# Patient Record
Sex: FEMALE | Race: WHITE | URBAN - METROPOLITAN AREA
[De-identification: names, ages, dates, MRNs, and addresses within clinical notes are randomized per-mention and may not be internally consistent; named-entity substitution may affect disease eponyms.]

---

## 2022-10-27 NOTE — DISCHARGE INSTRUCTIONS
[facility], [address] [place] [address] Road  Telephone: [phone]    Discharge Instructions    Important reminders:    **If you have any signs and symptoms of illness (Cough, fever, congestion, nausea, vomiting, diarrhea, etc.) please call the wound care center prior to your appointment. 1. Increase Protein intake for optimal wound healing  2. No added salt to reduce any swelling  3. If diabetic, maintain good glucose control  4. If you smoke, smoking prohibits wound healing, we ask that you refrain from smoking. 5. When taking antibiotics take the entire prescription as ordered. Do not stop taking until medication is all gone unless otherwise instructed. 6. Exercise as tolerated. 7. Keep weight off wounds and reposition every 2 hours if applicable. 8. If wound(s) is on your lower extremity, elevate legs to the level of the heart or above for 30 minutes 4-5 times a day and/or when sitting. Avoid standing for long periods of time. 9. Do not get wounds wet in bath or shower unless otherwise instructed by your physician. If your wound is on your foot or leg, you may purchase a cast bag. Please ask at the pharmacy. If Vascular testing is ordered, please call [address] [place] [address] ([phone]) to schedule. Vascular tests ordered by Wound Care Physicians may take up to 2 hours to complete. Please keep that in mind when scheduling. If Vascular testing is scheduled, please bring supplies to replace your dressing after testing is done. The vascular department does not stock supplies. Wound:  Left leg    With each dressing change, rinse wounds with 0.9% Saline. (May use wound wash or soft contact solution. Both can be purchased at a local drug store). If unable to obtain saline, may use a gentle soap and water. Dressing care: NO Smoking. Do not get wet in the shower. Clean wound with Vash (give pt a bottle). Gentamicin cream and Triad mixed.  Place in wound daily. Cover with dry dressing (kerramax border or 4x4 and roll gauze). 1 tubigrip. Keep wound covered at all times    Dr Adam Fisher, Vascular Surgeon  - Stephen Huddleston for studies  [address] [place] [address], [address]  Phone# [phone]  Fax# [phone]      Home Care Agency/Facility:     Your wound-care supplies will be provided by: [employer] [facility] -   phone #: [phone]    Please note, depending on your insurance coverage, you may have out-of-pocket expenses for these supplies. Someone from the company should call you to confirm your order and discuss those potential costs before they ship your products -- please anticipate that call. If your out-of-pocket cost could be substantial, Many companies have financial hardship programs for patients who qualify, so please ask about that if you might need a hand. If you have any questions about your supplies or your potential out-of-pocket costs, or if you need to place an order for a refill of supplies (typically monthly), please call the company directly. Your  is Javon Miller    Follow up with Dr Luis Angel Whiteside In 1 week(s) in the wound care center. Wound Care Center Information: Should you experience any significant changes in your wound(s) or have questions about your wound care, please contact the [facility] at [phone] Monday  - Thursday 8:00 am - 4:00 pm and Friday 8:00 am - 1:00pm. If you need help with your wound outside these hours and cannot wait until we are again available, contact your PCP or go to the hospital emergency room. PLEASE NOTE: IF YOU ARE UNABLE TO OBTAIN WOUND SUPPLIES, CONTINUE TO USE THE SUPPLIES YOU HAVE AVAILABLE UNTIL YOU ARE ABLE TO REACH US. IT IS MOST IMPORTANT TO KEEP THE WOUND COVERED AT ALL TIMES. Patient Experience    Thank you for trusting us with your care. You may receive a survey from a company called [employer] asking for your feedback.   We would appreciate it if you took a few minutes to share your experience. Your input is very valuable to us.

## 2022-10-28 ENCOUNTER — HOSPITAL ENCOUNTER (OUTPATIENT)
Dept: WOUND CARE | Age: 72
Discharge: HOME | End: 2022-10-28
Payer: COMMERCIAL

## 2022-10-28 VITALS
HEART RATE: 73 BPM | RESPIRATION RATE: 16 BRPM | TEMPERATURE: 97.1 F | DIASTOLIC BLOOD PRESSURE: 72 MMHG | SYSTOLIC BLOOD PRESSURE: 146 MMHG

## 2022-10-28 DIAGNOSIS — I73.9 PAD (PERIPHERAL ARTERY DISEASE) (HCC): Primary | ICD-10-CM

## 2022-10-28 DIAGNOSIS — L97.223 NON-PRESSURE CHRONIC ULCER OF LEFT CALF WITH NECROSIS OF MUSCLE (HCC): ICD-10-CM

## 2022-10-28 DIAGNOSIS — E11.622 DIABETES MELLITUS WITH SKIN ULCER (HCC): ICD-10-CM

## 2022-10-28 DIAGNOSIS — I73.89 OTHER SPECIFIED PERIPHERAL VASCULAR DISEASES (HCC): ICD-10-CM

## 2022-10-28 DIAGNOSIS — L98.499 DIABETES MELLITUS WITH SKIN ULCER (HCC): ICD-10-CM

## 2022-10-28 PROCEDURE — 99203 OFFICE O/P NEW LOW 30 MIN: CPT

## 2022-10-28 PROCEDURE — 11043 DBRDMT MUSC&/FSCA 1ST 20/<: CPT

## 2022-10-28 RX ORDER — ACETAMINOPHEN AND CODEINE PHOSPHATE 300; 30 MG/1; MG/1
1 TABLET ORAL PRN
COMMUNITY
Start: 2022-09-19

## 2022-10-28 RX ORDER — SITAGLIPTIN AND METFORMIN HYDROCHLORIDE 1000; 50 MG/1; MG/1
1 TABLET, FILM COATED ORAL DAILY
COMMUNITY

## 2022-10-28 RX ORDER — ALBUTEROL SULFATE 90 UG/1
1 AEROSOL, METERED RESPIRATORY (INHALATION) EVERY 6 HOURS PRN
COMMUNITY

## 2022-10-28 RX ORDER — PRAVASTATIN SODIUM 20 MG
20 TABLET ORAL DAILY
COMMUNITY

## 2022-10-28 RX ORDER — GENTAMICIN SULFATE 1 MG/G
CREAM TOPICAL
Qty: 30 G | Refills: 1 | Status: SHIPPED | OUTPATIENT
Start: 2022-10-28

## 2022-10-28 RX ORDER — LISINOPRIL 2.5 MG/1
2.5 TABLET ORAL DAILY
COMMUNITY

## 2022-10-28 RX ORDER — DULAGLUTIDE 1.5 MG/.5ML
0.5 INJECTION, SOLUTION SUBCUTANEOUS WEEKLY
COMMUNITY

## 2022-10-28 RX ORDER — ASPIRIN 81 MG/1
81 TABLET ORAL DAILY
COMMUNITY

## 2022-10-28 NOTE — PROGRESS NOTES
Jourdan Medrano  Progress Note and Procedure Note      Andrea Pantoja  AGE: 67 y.o. GENDER: female  : 1950  TODAY'S DATE:  10/28/2022    Subjective:     Chief Complaint   Patient presents with    Wound Check     Left lower extremity         HISTORY of PRESENT ILLNESS HPI     Andrea Pantoja is a 67 y.o. female who presents today for wound evaluation. History of Wound: Patient admits to having a surgical procedure to remove skin cancer at the end of 2022. She has not been able to heal the wound since that has been sent to the wound care center. She admits to having a previous injury to the leg and having discoloration in the area where the skin cancer was for many years. She denies current nausea, vomiting, fever, chills, shortness of breath or chest pain. She admits to being diabetic and smoking 1-1/2 packs of cigarettes daily. Wound Pain:  intermittent  Severity:  3 / 10   Wound Type:  venous, arterial, diabetic, and non-healing surgical  Modifying Factors:  edema, venous stasis, diabetes, smoking, arterial insufficiency, and decreased tissue oxygenation  Associated Signs/Symptoms:  edema, drainage, and pain        PAST MEDICAL HISTORY        Diagnosis Date    Cancer [facility])     Skin cancer Left lower leg    COPD (chronic obstructive pulmonary disease) ([facility]     Diabetes mellitus ([facility]     Hyperlipidemia        PAST SURGICAL HISTORY    Past Surgical History:   Procedure Laterality Date    ORTHOPEDIC SURGERY Left        FAMILY HISTORY    History reviewed. No pertinent family history.     SOCIAL HISTORY    Social History     Tobacco Use    Smoking status: Every Day     Packs/day: 1.50     Years: 40.00     Pack years: 60.00     Types: Cigarettes    Smokeless tobacco: Never   Vaping Use    Vaping Use: Never used   Substance Use Topics    Alcohol use: Not Currently    Drug use: Never       ALLERGIES    No Known Allergies    MEDICATIONS    Current Outpatient Medications on File Prior to Encounter   Medication Sig Dispense Refill    Multiple Vitamin (MULTIVITAMIN PO) Take 1 tablet by mouth daily      albuterol sulfate HFA (PROVENTIL HFA) 108 (90 Base) MCG/ACT inhaler Inhale 1 puff into the lungs every 6 hours as needed      acetaminophen-codeine (TYLENOL #3) 300-30 MG per tablet Take 1 tablet by mouth as needed. aspirin 81 MG EC tablet Take 81 mg by mouth daily      dulaglutide (TRULICITY) 1.5 HF/9.0YP SC injection Inject 0.5 mLs into the skin once a week      lisinopril (PRINIVIL;ZESTRIL) 2.5 MG tablet Take 2.5 mg by mouth daily      pravastatin (PRAVACHOL) 20 MG tablet Take 20 mg by mouth daily      sitaGLIPtan-metFORMIN (JANUMET)  MG per tablet Take 1 tablet by mouth daily       No current facility-administered medications on file prior to encounter. REVIEW OF SYSTEMS    Pertinent items are noted in HPI. Objective:      BP (!) 146/72   Pulse 73   Temp 97.1 °F (36.2 °C) (Infrared)   Resp 16     PHYSICAL EXAM    Vascular: Vascular status Impaired  palpable pedal pulses, right DP1/4 and PT1/4, left DP1/4 and PT1/4. CFT 3 seconds digits 1 to 5 bilateral.  Hair growthAbsent  both lower extremities and feet. Skin temperature is warm to cool from pretibial area to distal digits bilateral.  Exam is negative for rubor, pallor, cyanosis or signs of acute vascular compromise bilaterally. Exam is negative for edema bilateral lower extremity. Varicosities Absent  bilateral lower extremity. Neuro: Neurologic status diminished bilateral with epicritic Present , proprioceptive Present, vibratory sensationPresent and protopathicPresent. DTRs Present bilateral Achilles. There were no reproducible neuritic symptoms on exam bilateral feet/ankles. Derm: Ulceration to left leg. Ecchymosis Absent  bilateral feet/foot. Musculoskeletal: No pain with debridement of wound 5/5 muscle strength in/eversion and dorsi/plantarflexion bilateral feet.   No gross instability noted.          Assessment:     Problem List Items Addressed This Visit    None  Visit Diagnoses       PAD (peripheral artery disease) ([facility]    -  Primary    Relevant Orders    VL DUP LOWER EXTREMITY ARTERIES LEFT            Procedure Note    Performed by: Sridevi Hampton DPM    Consent obtained: Yes    Time out taken:  Yes    Pain Control: Anesthetic  Anesthetic: 4% Lidocaine Cream     Debridement:Excisional Debridement    Using curette the wound was sharply debrided    down through and including the removal of epidermis, dermis, subcutaneous tissue, and muscle/fascia. Devitalized Tissue Debrided:  fibrin, biofilm, slough, necrotic/eschar, and exudate    Pre Debridement Measurements:  Are located in the Wound Documentation Flow Sheet    Wound #: 1     Post  Debridement Measurements:  Wound 10/28/22 Leg Left; Lower;Medial #1 (Active)   Wound Image   10/28/22 0948   Wound Etiology Non-Healing Surgical 10/28/22 0948   Wound Cleansed Cleansed with saline 10/28/22 0948   Wound Length (cm) 3.9 cm 10/28/22 0948   Wound Width (cm) 1.5 cm 10/28/22 0948   Wound Depth (cm) 0.3 cm 10/28/22 0948   Wound Surface Area (cm^2) 5.85 cm^2 10/28/22 0948   Wound Volume (cm^3) 1.755 cm^3 10/28/22 0948   Post-Procedure Length (cm) 3.9 cm 10/28/22 1018   Post-Procedure Width (cm) 1.5 cm 10/28/22 1018   Post-Procedure Depth (cm) 0.3 cm 10/28/22 1018   Post-Procedure Surface Area (cm^2) 5.85 cm^2 10/28/22 1018   Post-Procedure Volume (cm^3) 1.755 cm^3 10/28/22 1018   Wound Assessment Bleeding 10/28/22 1018   Drainage Amount Small 10/28/22 0948   Drainage Description Yellow 10/28/22 0948   Odor None 10/28/22 0948   Aga-wound Assessment Maceration 10/28/22 0948   Margins Defined edges 10/28/22 0948   Wound Thickness Description not for Pressure Injury Full thickness 10/28/22 0948   Number of days: 0           Total Surface Area Debrided:  5.85 sq cm     Percentage of wound debrided 100%    Bleeding:  Minimal    Hemostasis Achieved: by pressure    Procedural Pain:  0  / 10     Post Procedural Pain:  0 / 10     Response to treatment:  Well tolerated by patient. Plan:   Patient examined and evaluated  Wound sharply debrided without incident  Discussed appropriate diabetic diet  Encourage smoking decrease or cessation  Request the Pt smoke outside  Daily dressing changes with gentamicin cream and triad  Vascular studies ordered consult vascular surgery  The nature of the patient's condition was explained in depth.  The patient was informed that their compliance to the treatment plan is paramount to successful healing and prevention of further ulceration and/or infection     Discharge Treatment Daily dressing changes    Written Patient Discharge Instructions Given            Electronically signed by Tremaine Anderson DPM on 10/28/2022 at 10:24 AM

## 2022-10-28 NOTE — PLAN OF CARE
Discharge instructions given. Patient verbalized understanding. Return to [facility] in 1 week(s).   Art studies ordered, Ref to Dr Aroldo Ryder

## 2022-10-28 NOTE — PLAN OF CARE
[address]:      [address] f: [phone] [phone] p: [phone] [email or web address]     Ordering Center: Loyd Terry [address] [place] [address]  [phone]  Dept: [phone]   Fax# [phone]    Patient Information:      esvinjolene [id number]  [address], [address] [facility]  [phone]     1950    67 y.o. Todays date 10/28/2022      Insurance:      PRIMARY INSURANCE:  Plan: BCBS - [place] HMO  Coverage: BCBS  Effective Date: 2022  Group Number: [unfilled]  Subscriber Number: [id number] - ([facility])    Payer/Plan Subscr  Sex Relation Sub. Ins. ID Effective Group Num   1. [address] [facility] 1950 Female Self [id number] [facility]                                    [id number]         Patient Wound Information:     Additional ICD-10 Codes: E11.621, L97.223    There is no problem list on file for this patient. WOUNDS REQUIRING DRESSING SUPPLIES:     Wound 10/28/22 Leg Left; Lower;Medial #1 (Active)   Wound Image   10/28/22 09   Wound Etiology Non-Healing Surgical 10/28/22 09   Wound Cleansed Cleansed with saline 10/28/22 0948   Wound Length (cm) 3.9 cm 10/28/22 0948   Wound Width (cm) 1.5 cm 10/28/22 0948   Wound Depth (cm) 0.3 cm 10/28/22 0948   Wound Surface Area (cm^2) 5.85 cm^2 10/28/22 0948   Wound Volume (cm^3) 1.755 cm^3 10/28/22 0948   Post-Procedure Length (cm) 3.9 cm 10/28/22 1018   Post-Procedure Width (cm) 1.5 cm 10/28/22 1018   Post-Procedure Depth (cm) 0.3 cm 10/28/22 1018   Post-Procedure Surface Area (cm^2) 5.85 cm^2 10/28/22 1018   Post-Procedure Volume (cm^3) 1.755 cm^3 10/28/22 1018   Wound Assessment Bleeding 10/28/22 1018   Drainage Amount Moderate 10/28/22 1018   Drainage Description Yellow 10/28/22 0948   Odor None 10/28/22 0948   Aga-wound Assessment Maceration 10/28/22 0948   Margins Defined edges 10/28/22 09   Wound Thickness Description not for Pressure Injury Full thickness 10/28/22 0948   Number of days: 0          Supplies Requested :      DISPENSE AS WRITTEN    WOUND #: 1   PRIMARY DRESSING:    None   Cover and Secure with:  Other Kerramax Gentle border     FREQUENCY OF DRESSING CHANGES:  Daily    Wound Thickness [x] Full   []Partial             Patient Wound(s) Debrided: [x] Yes   [] No    Debridement Date: 10/28/2022    Debribement Type: Excisional/Sharp    ADDITIONAL ITEMS:  [] Gloves Small  [x] Gloves Medium [] Gloves Large [] Gloves XLarge [] Paper Tape 2\" [] Paper Tape 3\"  [] Medipore Tape 3\" [] Medipore Tape 4\"    [] Hypofix skin sensitive tape 2\"  [] Hypofix skin sensitive tape 4\"  [x] Saline  [] Skin Prep   [] Adhesive Remover   [x] Cotton Tip Applicators  [] Tubular Stocking   [] Size E  [] Size G  [] Other:    Patient currently being seen by Home Health: [] Yes   [x] No    Quantity of days dispensed:  []15  [x]30  []60  []90 Days    Order valid for 90 days    Provider Information:      PROVIDER'S NAME/NPI  Eliana Correa  [id number]    I give permission to coordinate the care for this patient

## 2022-10-31 NOTE — DISCHARGE INSTRUCTIONS
[address], [address]  Telephone: [phone]     Discharge Instructions     Important reminders:     **If you have any signs and symptoms of illness (Cough, fever, congestion, nausea, vomiting, diarrhea, etc.) please call the wound care center prior to your appointment. 1. Increase Protein intake for optimal wound healing  2. No added salt to reduce any swelling  3. If diabetic, maintain good glucose control  4. If you smoke, smoking prohibits wound healing, we ask that you refrain from smoking. 5. When taking antibiotics take the entire prescription as ordered. Do not stop taking until medication is all gone unless otherwise instructed. 6. Exercise as tolerated. 7. Keep weight off wounds and reposition every 2 hours if applicable. 8. If wound(s) is on your lower extremity, elevate legs to the level of the heart or above for 30 minutes 4-5 times a day and/or when sitting. Avoid standing for long periods of time. 9. Do not get wounds wet in bath or shower unless otherwise instructed by your physician. If your wound is on your foot or leg, you may purchase a cast bag. Please ask at the pharmacy. If Vascular testing is ordered, please call [address] [place] [address] [phone]) to schedule. Vascular tests ordered by Wound Care Physicians may take up to 2 hours to complete. Please keep that in mind when scheduling. If Vascular testing is scheduled, please bring supplies to replace your dressing after testing is done. The vascular department does not stock supplies. Wound:  Left leg     With each dressing change, rinse wounds with 0.9% Saline. (May use wound wash or soft contact solution. Both can be purchased at a local drug store). If unable to obtain saline, may use a gentle soap and water. Dressing care: NO Smoking. Do not get wet in the shower. Clean wound with Vash (give pt a bottle). Gentamicin cream and Triad mixed. Place in wound daily. Cover with dry dressing (kerramax border or 4x4 and roll gauze). 1 tubigrip. Keep wound covered at all times     Dr Mariama cMgraw, Vascular Surgeon  - Kristyn Serrato for studies  [address] [facility], [address]  Phone# [phone]  Fax# [phone]        Home Care Agency/Facility:      Your wound-care supplies will be provided by: [facility] -   phone #: [phone]     Please note, depending on your insurance coverage, you may have out-of-pocket expenses for these supplies. Someone from the company should call you to confirm your order and discuss those potential costs before they ship your products -- please anticipate that call. If your out-of-pocket cost could be substantial, Many companies have financial hardship programs for patients who qualify, so please ask about that if you might need a hand. If you have any questions about your supplies or your potential out-of-pocket costs, or if you need to place an order for a refill of supplies (typically monthly), please call the company directly. Your  is Matthew Baldwin     Follow up with Dr Rudy Marley In 1 week(s) in the wound care center. Wound Care Center Information: Should you experience any significant changes in your wound(s) or have questions about your wound care, please [facility] at [phone] Monday  - Thursday 8:00 am - 4:00 pm and Friday 8:00 am - 1:00pm. If you need help with your wound outside these hours and cannot wait until we are again available, contact your PCP or go to the hospital emergency room. PLEASE NOTE: IF YOU ARE UNABLE TO OBTAIN WOUND SUPPLIES, CONTINUE TO USE THE SUPPLIES YOU HAVE AVAILABLE UNTIL YOU ARE ABLE TO REACH US. IT IS MOST IMPORTANT TO KEEP THE WOUND COVERED AT ALL TIMES. Patient Experience     Thank you for trusting us with your care. You may receive a survey from a company called [employer] asking for your feedback.   We would appreciate it if you took a few minutes to share your experience. Your input is very valuable to us.

## 2022-11-04 ENCOUNTER — HOSPITAL ENCOUNTER (OUTPATIENT)
Dept: WOUND CARE | Age: 72
Discharge: HOME | End: 2022-11-04
Payer: COMMERCIAL

## 2022-11-04 VITALS
RESPIRATION RATE: 16 BRPM | HEART RATE: 57 BPM | SYSTOLIC BLOOD PRESSURE: 163 MMHG | TEMPERATURE: 97.1 F | DIASTOLIC BLOOD PRESSURE: 62 MMHG

## 2022-11-04 DIAGNOSIS — L97.223 NON-PRESSURE CHRONIC ULCER OF LEFT CALF WITH NECROSIS OF MUSCLE (HCC): Primary | ICD-10-CM

## 2022-11-04 PROCEDURE — 11043 DBRDMT MUSC&/FSCA 1ST 20/<: CPT

## 2022-11-04 RX ORDER — BACITRACIN, NEOMYCIN, POLYMYXIN B 400; 3.5; 5 [USP'U]/G; MG/G; [USP'U]/G
OINTMENT TOPICAL ONCE
Status: CANCELLED | OUTPATIENT
Start: 2022-11-04 | End: 2022-11-04

## 2022-11-04 RX ORDER — LIDOCAINE HYDROCHLORIDE 40 MG/ML
SOLUTION TOPICAL ONCE
Status: CANCELLED | OUTPATIENT
Start: 2022-11-04 | End: 2022-11-04

## 2022-11-04 RX ORDER — LIDOCAINE HYDROCHLORIDE 20 MG/ML
JELLY TOPICAL ONCE
Status: CANCELLED | OUTPATIENT
Start: 2022-11-04 | End: 2022-11-04

## 2022-11-04 RX ORDER — BACITRACIN ZINC AND POLYMYXIN B SULFATE 500; 1000 [USP'U]/G; [USP'U]/G
OINTMENT TOPICAL ONCE
Status: CANCELLED | OUTPATIENT
Start: 2022-11-04 | End: 2022-11-04

## 2022-11-04 RX ORDER — CLOBETASOL PROPIONATE 0.5 MG/G
OINTMENT TOPICAL ONCE
Status: CANCELLED | OUTPATIENT
Start: 2022-11-04 | End: 2022-11-04

## 2022-11-04 RX ORDER — GINSENG 100 MG
CAPSULE ORAL ONCE
Status: CANCELLED | OUTPATIENT
Start: 2022-11-04 | End: 2022-11-04

## 2022-11-04 RX ORDER — LIDOCAINE 40 MG/G
CREAM TOPICAL ONCE
Status: CANCELLED | OUTPATIENT
Start: 2022-11-04 | End: 2022-11-04

## 2022-11-04 RX ORDER — GENTAMICIN SULFATE 1 MG/G
OINTMENT TOPICAL ONCE
Status: DISCONTINUED | OUTPATIENT
Start: 2022-11-04 | End: 2022-11-05 | Stop reason: HOSPADM

## 2022-11-04 RX ORDER — LIDOCAINE 50 MG/G
OINTMENT TOPICAL ONCE
Status: CANCELLED | OUTPATIENT
Start: 2022-11-04 | End: 2022-11-04

## 2022-11-04 RX ORDER — CYANOCOBALAMIN (VITAMIN B-12) 500 MCG
5000 TABLET ORAL DAILY
COMMUNITY

## 2022-11-04 RX ORDER — BETAMETHASONE DIPROPIONATE 0.05 %
OINTMENT (GRAM) TOPICAL ONCE
Status: CANCELLED | OUTPATIENT
Start: 2022-11-04 | End: 2022-11-04

## 2022-11-04 RX ORDER — ASCORBIC ACID 500 MG
500 TABLET ORAL DAILY
COMMUNITY

## 2022-11-04 RX ORDER — GENTAMICIN SULFATE 1 MG/G
OINTMENT TOPICAL ONCE
Status: CANCELLED | OUTPATIENT
Start: 2022-11-04 | End: 2022-11-04

## 2022-11-04 RX ORDER — LIDOCAINE 40 MG/G
CREAM TOPICAL ONCE
Status: DISCONTINUED | OUTPATIENT
Start: 2022-11-04 | End: 2022-11-05 | Stop reason: HOSPADM

## 2022-11-04 NOTE — PLAN OF CARE
Discharge instructions given. Patient verbalized understanding. Return to [address] [place] [address],[address] in 1 week(s).

## 2022-11-04 NOTE — PROGRESS NOTES
Jourdan Medrano  Progress Note and Procedure Note      Ang Kearns  AGE: 67 y.o. GENDER: female  : 1950  TODAY'S DATE:  2022    Subjective:     Chief Complaint   Patient presents with    Wound Check         HISTORY of PRESENT ILLNESS HPI     Ang Kearns is a 67 y.o. female who presents today for wound evaluation. History of Wound: Patient admits to having a surgical procedure to remove skin cancer at the end of 2022. She has not been able to heal the wound since that has been sent to the wound care center. She admits to having a previous injury to the leg and having discoloration in the area where the skin cancer was for many years. She denies current nausea, vomiting, fever, chills, shortness of breath or chest pain. She admits to being diabetic and smoking 1-1/2 packs of cigarettes daily. Wound Pain:  intermittent  Severity:  3 / 10   Wound Type:  venous, arterial, diabetic, and non-healing surgical  Modifying Factors:  edema, venous stasis, diabetes, smoking, arterial insufficiency, and decreased tissue oxygenation  Associated Signs/Symptoms:  edema, drainage, and pain        PAST MEDICAL HISTORY        Diagnosis Date    Cancer [facility])     Skin cancer Left lower leg    COPD (chronic obstructive pulmonary disease) ([facility] [address]     Diabetes mellitus ([facility] [address]     Hyperlipidemia        PAST SURGICAL HISTORY    Past Surgical History:   Procedure Laterality Date    ORTHOPEDIC SURGERY Left        FAMILY HISTORY    No family history on file.     SOCIAL HISTORY    Social History     Tobacco Use    Smoking status: Every Day     Packs/day: 1.50     Years: 40.00     Pack years: 60.00     Types: Cigarettes    Smokeless tobacco: Never   Vaping Use    Vaping Use: Never used   Substance Use Topics    Alcohol use: Not Currently    Drug use: Never       ALLERGIES    No Known Allergies    MEDICATIONS    Current Outpatient Medications on File Prior to Encounter   Medication Sig Dispense Refill Cyanocobalamin (VITAMIN B 12) 500 MCG TABS Take 5,000 mcg by mouth daily      vitamin C (ASCORBIC ACID) 500 MG tablet Take 500 mg by mouth daily      Calcium Carb-Cholecalciferol (CALCIUM 1000 + D) 1000-800 MG-UNIT TABS Take 2 tablets by mouth daily      Multiple Vitamin (MULTIVITAMIN PO) Take 1 tablet by mouth daily      albuterol sulfate HFA (PROVENTIL;VENTOLIN;PROAIR) 108 (90 Base) MCG/ACT inhaler Inhale 1 puff into the lungs every 6 hours as needed (Patient not taking: Reported on 11/4/2022)      acetaminophen-codeine (TYLENOL #3) 300-30 MG per tablet Take 1 tablet by mouth as needed. aspirin 81 MG EC tablet Take 81 mg by mouth daily      dulaglutide (TRULICITY) 1.5 LF/0.5PA SC injection Inject 0.5 mLs into the skin once a week      lisinopril (PRINIVIL;ZESTRIL) 2.5 MG tablet Take 2.5 mg by mouth daily      pravastatin (PRAVACHOL) 20 MG tablet Take 20 mg by mouth daily      sitaGLIPtan-metFORMIN (JANUMET)  MG per tablet Take 1 tablet by mouth daily      gentamicin (GARAMYCIN) 0.1 % cream Apply topically daily. 30 g 1     No current facility-administered medications on file prior to encounter. REVIEW OF SYSTEMS    Pertinent items are noted in HPI. Objective:      BP (!) 163/62   Pulse 57   Temp 97.1 °F (36.2 °C)   Resp 16     PHYSICAL EXAM    Vascular: Vascular status Impaired  palpable pedal pulses, right DP1/4 and PT1/4, left DP1/4 and PT1/4. CFT 3 seconds digits 1 to 5 bilateral.  Hair growthAbsent  both lower extremities and feet. Skin temperature is warm to cool from pretibial area to distal digits bilateral.  Exam is negative for rubor, pallor, cyanosis or signs of acute vascular compromise bilaterally. Exam is negative for edema bilateral lower extremity. Varicosities Absent  bilateral lower extremity. Neuro: Neurologic status diminished bilateral with epicritic Present , proprioceptive Present, vibratory sensationPresent and protopathicPresent.   DTRs Present bilateral Achilles. There were no reproducible neuritic symptoms on exam bilateral feet/ankles. Derm: Ulceration to left leg. Ecchymosis Absent  bilateral feet/foot. Musculoskeletal: No pain with debridement of wound 5/5 muscle strength in/eversion and dorsi/plantarflexion bilateral feet. No gross instability noted. 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 ([address] - Primary    Relevant Medications    lidocaine (LMX) 4 % cream (Start on 11/4/2022 11:00 AM)    gentamicin (GARAMYCIN) 0.1 % ointment (Start on 11/4/2022 11:00 AM)    Other Relevant Orders    Initiate Outpatient Wound Care Protocol       Procedure Note    Performed by: Sabine Hendrickson DPM    Consent obtained: Yes    Time out taken:  Yes    Pain Control: Anesthetic  Anesthetic: 4% Lidocaine Cream     Debridement:Excisional Debridement    Using curette the wound was sharply debrided    down through and including the removal of epidermis, dermis, subcutaneous tissue, and muscle/fascia. Devitalized Tissue Debrided:  fibrin, biofilm, slough, necrotic/eschar, and exudate    Pre Debridement Measurements:  Are located in the Wound Documentation Flow Sheet    Wound #: 1     Wound Care Documentation:  Wound 10/28/22 Leg Left; Lower;Medial #1 (Active)   Wound Image   10/28/22 0948   Wound Etiology Non-Healing Surgical 11/04/22 1010   Wound Cleansed Cleansed with saline 11/04/22 1010   Dressing/Treatment Dry dressing 10/28/22 1018   Wound Length (cm) 4 cm 11/04/22 1010   Wound Width (cm) 1.9 cm 11/04/22 1010   Wound Depth (cm) 0.3 cm 11/04/22 1010   Wound Surface Area (cm^2) 7.6 cm^2 11/04/22 1010   Change in Wound Size % (l*w) -29.91 11/04/22 1010   Wound Volume (cm^3) 2.28 cm^3 11/04/22 1010   Wound Healing % -30 11/04/22 1010   Post-Procedure Length (cm) 4 cm 11/04/22 1030   Post-Procedure Width (cm) 1.9 cm 11/04/22 1030   Post-Procedure Depth (cm) 0.3 cm 11/04/22 1030 Post-Procedure Surface Area (cm^2) 7.6 cm^2 11/04/22 1030   Post-Procedure Volume (cm^3) 2.28 cm^3 11/04/22 1030   Wound Assessment Bleeding 11/04/22 1030   Drainage Amount Moderate 11/04/22 1030   Drainage Description Yellow;Serosanguinous; Thick 11/04/22 1010   Odor None 11/04/22 1010   Aga-wound Assessment Hemosiderin staining (brown yellow) 11/04/22 1010   Margins Defined edges 11/04/22 1010   Wound Thickness Description not for Pressure Injury Full thickness 10/28/22 0948   Number of days: 7         Total Surface Area Debrided:  7.6 sq cm     Percentage of wound debrided 100%    Bleeding:  Minimal    Hemostasis Achieved:  by pressure    Procedural Pain:  0  / 10     Post Procedural Pain:  0 / 10     Response to treatment:  Well tolerated by patient. Plan:   Patient examined and evaluated  Wound sharply debrided without incident  Discussed appropriate diabetic diet  Encourage smoking decrease or cessation  Request the Pt smoke outside  Daily dressing changes with gentamicin cream and triad  Admits to having her vascular studies next week and to follow-up with vascular surgery to the following week  The nature of the patient's condition was explained in depth.  The patient was informed that their compliance to the treatment plan is paramount to successful healing and prevention of further ulceration and/or infection     Discharge Treatment Daily dressing changes    Written Patient Discharge Instructions Given            Electronically signed by Sridevi Hampton DPM on 11/4/2022 at 10:43 AM

## 2022-11-07 ENCOUNTER — PROCEDURE VISIT (OUTPATIENT)
Dept: VASCULAR SURGERY | Age: 72
End: 2022-11-07

## 2022-11-07 DIAGNOSIS — I73.9 PAD (PERIPHERAL ARTERY DISEASE) (HCC): ICD-10-CM

## 2022-11-11 ENCOUNTER — HOSPITAL ENCOUNTER (OUTPATIENT)
Dept: WOUND CARE | Age: 72
Discharge: HOME | End: 2022-11-11
Payer: COMMERCIAL

## 2022-11-11 VITALS
HEART RATE: 63 BPM | RESPIRATION RATE: 16 BRPM | DIASTOLIC BLOOD PRESSURE: 65 MMHG | TEMPERATURE: 97 F | SYSTOLIC BLOOD PRESSURE: 165 MMHG

## 2022-11-11 DIAGNOSIS — L97.223 NON-PRESSURE CHRONIC ULCER OF LEFT CALF WITH NECROSIS OF MUSCLE (HCC): Primary | ICD-10-CM

## 2022-11-11 PROCEDURE — 11043 DBRDMT MUSC&/FSCA 1ST 20/<: CPT

## 2022-11-11 RX ORDER — GINSENG 100 MG
CAPSULE ORAL ONCE
Status: CANCELLED | OUTPATIENT
Start: 2022-11-11 | End: 2022-11-11

## 2022-11-11 RX ORDER — LIDOCAINE HYDROCHLORIDE 20 MG/ML
JELLY TOPICAL ONCE
Status: CANCELLED | OUTPATIENT
Start: 2022-11-11 | End: 2022-11-11

## 2022-11-11 RX ORDER — LIDOCAINE 50 MG/G
OINTMENT TOPICAL ONCE
Status: CANCELLED | OUTPATIENT
Start: 2022-11-11 | End: 2022-11-11

## 2022-11-11 RX ORDER — CLOBETASOL PROPIONATE 0.5 MG/G
OINTMENT TOPICAL ONCE
Status: CANCELLED | OUTPATIENT
Start: 2022-11-11 | End: 2022-11-11

## 2022-11-11 RX ORDER — BETAMETHASONE DIPROPIONATE 0.05 %
OINTMENT (GRAM) TOPICAL ONCE
Status: CANCELLED | OUTPATIENT
Start: 2022-11-11 | End: 2022-11-11

## 2022-11-11 RX ORDER — LIDOCAINE 40 MG/G
CREAM TOPICAL ONCE
Status: CANCELLED | OUTPATIENT
Start: 2022-11-11 | End: 2022-11-11

## 2022-11-11 RX ORDER — LIDOCAINE HYDROCHLORIDE 40 MG/ML
SOLUTION TOPICAL ONCE
Status: CANCELLED | OUTPATIENT
Start: 2022-11-11 | End: 2022-11-11

## 2022-11-11 RX ORDER — LIDOCAINE 40 MG/G
CREAM TOPICAL ONCE
Status: DISCONTINUED | OUTPATIENT
Start: 2022-11-11 | End: 2022-11-12 | Stop reason: HOSPADM

## 2022-11-11 RX ORDER — BACITRACIN ZINC AND POLYMYXIN B SULFATE 500; 1000 [USP'U]/G; [USP'U]/G
OINTMENT TOPICAL ONCE
Status: CANCELLED | OUTPATIENT
Start: 2022-11-11 | End: 2022-11-11

## 2022-11-11 RX ORDER — GENTAMICIN SULFATE 1 MG/G
OINTMENT TOPICAL ONCE
Status: CANCELLED | OUTPATIENT
Start: 2022-11-11 | End: 2022-11-11

## 2022-11-11 RX ORDER — BACITRACIN, NEOMYCIN, POLYMYXIN B 400; 3.5; 5 [USP'U]/G; MG/G; [USP'U]/G
OINTMENT TOPICAL ONCE
Status: CANCELLED | OUTPATIENT
Start: 2022-11-11 | End: 2022-11-11

## 2022-11-11 NOTE — PLAN OF CARE
Discharge instructions given. Patient verbalized understanding. Return to [address] [place] [address],[address] in 1 week(s).   Called/faxed orders to [facility]- prescription for [facility]

## 2022-11-14 ENCOUNTER — OFFICE VISIT (OUTPATIENT)
Dept: VASCULAR SURGERY | Age: 72
End: 2022-11-14
Payer: COMMERCIAL

## 2022-11-14 VITALS
DIASTOLIC BLOOD PRESSURE: 78 MMHG | WEIGHT: 179 LBS | BODY MASS INDEX: 28.09 KG/M2 | SYSTOLIC BLOOD PRESSURE: 136 MMHG | HEIGHT: 67 IN

## 2022-11-14 DIAGNOSIS — I73.9 PAD (PERIPHERAL ARTERY DISEASE) (HCC): Primary | ICD-10-CM

## 2022-11-14 PROCEDURE — 99203 OFFICE O/P NEW LOW 30 MIN: CPT | Performed by: SURGERY

## 2022-11-14 PROCEDURE — 1123F ACP DISCUSS/DSCN MKR DOCD: CPT | Performed by: SURGERY

## 2022-11-14 NOTE — LETTER
[facility]) - Vascular and Endovascular Surgeons  [address] [address]  Phone: [phone]  Fax: [phone]    Dominic Rosa MD    November 14, 2022     Jose Gutierrez MD  [address]    Patient: Ang Kearns   MR Number: [id number]   YOB: 1950   Date of Visit: 11/14/2022       Dear Jose Gutierrez:    Thank you for referring Ang Kearns to me for evaluation/treatment. Below are the relevant portions of my assessment and plan of care. If you have questions, please do not hesitate to call me. I look forward to following Jabari Neth along with you.     Sincerely,      Dominic Rosa MD

## 2022-11-14 NOTE — PROGRESS NOTES
[facility]  Consultation Note    Chief Complaint / Reason for Consultation  LLE ulcer    History of Present Illness  Patient is a 67 y.o. female with COPD, diabetes and hyperlipidemia referred today by Dr. Conor Sandhu for a left leg ulceration. This developed following a local skin cancer removal in August.  Patient complains of pain at the site. Unable to relate any history of claudication. No history of deep vein thrombosis. Denies aching heaviness in her legs. Review of Systems     Denies fevers, chills, chest pain, shortness of breath, nausea, vomiting, hematemesis, diarrhea, constipation, melena, hematochezia, wt changes, vision problems, blindness, hearing problems, facial droop, slurred speech, extremity weakness, extremity numbness, dysuria. Past Medical History:   has a past medical history of Cancer ([facility] [address] COPD (chronic obstructive pulmonary disease) ([facility] [address] Diabetes mellitus ([facility] [address] and Hyperlipidemia. Past Surgical History:   has a past surgical history that includes orthopedic surgery (Left). Medications:  Current Outpatient Medications on File Prior to Visit   Medication Sig Dispense Refill    collagenase (SANTYL) 250 UNIT/GM ointment Quantity sufficient for 30 days. Apply topically daily. Wounds (cm): Left lower leg- 3.7 x 2 60 g 1    Cyanocobalamin (VITAMIN B 12) 500 MCG TABS Take 5,000 mcg by mouth daily      vitamin C (ASCORBIC ACID) 500 MG tablet Take 500 mg by mouth daily      Calcium Carb-Cholecalciferol (CALCIUM 1000 + D) 1000-800 MG-UNIT TABS Take 2 tablets by mouth daily      Multiple Vitamin (MULTIVITAMIN PO) Take 1 tablet by mouth daily      albuterol sulfate HFA (PROVENTIL;VENTOLIN;PROAIR) 108 (90 Base) MCG/ACT inhaler Inhale 1 puff into the lungs every 6 hours as needed (Patient not taking: Reported on 11/4/2022)      acetaminophen-codeine (TYLENOL #3) 300-30 MG per tablet Take 1 tablet by mouth as needed.       aspirin 81 MG EC tablet Take 81 mg by mouth daily      dulaglutide (TRULICITY) 1.5 XQ/0.8OE SC injection Inject 0.5 mLs into the skin once a week      lisinopril (PRINIVIL;ZESTRIL) 2.5 MG tablet Take 2.5 mg by mouth daily      pravastatin (PRAVACHOL) 20 MG tablet Take 20 mg by mouth daily      sitaGLIPtan-metFORMIN (JANUMET)  MG per tablet Take 1 tablet by mouth daily      gentamicin (GARAMYCIN) 0.1 % cream Apply topically daily. 30 g 1     No current facility-administered medications on file prior to visit. Allergies:  No Known Allergies     Social History:   reports that she has been smoking cigarettes. She has a 60.00 pack-year smoking history. She has never used smokeless tobacco. She reports that she does not currently use alcohol. She reports that she does not use drugs. Family History:  family history is not on file. Vital Signs  Vitals:    11/14/22 0949   BP: 136/78   Weight: 179 lb (81.2 kg)   Height: 5' 7\" (1.702 m)       Physical Examination  General:  no apparent distress  Psychiatric: affect appropriate  Head/Eyes/Ears/Nose/Throat:  Atraumatic, vision and hearing intact, face symmetric  Neck:  supple  Chest/Lungs: clear to auscultation bilaterally  Cardiac:  Regular rate and rhythm  Abdomen: soft, nontender  Extremities: 4 x 2 cm ulcer left lower leg medial just proximal to the ankle. Associated skin changes consistent with venous stasis dermatitis. Trace edema noted. 1+ palpable  Labs  No results found for: WBC, HGB, HCT, MCV, PLT  No results found for: NA, K, CL, CO2, PHOS, BUN, CREATININE, CA   No results found for: GLU    Imaging: The right MILLA is . 87. The left MILLA is . 89. The majority of the waveforms are biphasic throughout the left lower    extremity. Elevated velocity of the common femoral artery suggest a greater than 50%    stenosis. Elevated velocity of the distal superficial femoral artery suggests a less    than 50% stenosis.        No orders to display     No results found.    Assessment:   Left lower extremity ulceration      Plan:  19-year-old female with left lower extremity ulceration that is likely mixed venous and arterial.  Her noninvasive studies suggest mild arterial insufficiency and this was reviewed with her in detail today. Would continue with current wound care and increase the amount of compression to the area. If she continues to struggle or sees slow wound healing then would move forward with peripheral angiography and this was discussed with her today. Margarito Beach M.D., FACS.  11/14/2022  10:01 AM

## 2022-11-18 ENCOUNTER — HOSPITAL ENCOUNTER (OUTPATIENT)
Dept: WOUND CARE | Age: 72
Discharge: HOME | End: 2022-11-18
Payer: COMMERCIAL

## 2022-11-18 VITALS
RESPIRATION RATE: 18 BRPM | SYSTOLIC BLOOD PRESSURE: 166 MMHG | TEMPERATURE: 97.3 F | DIASTOLIC BLOOD PRESSURE: 77 MMHG | HEART RATE: 71 BPM

## 2022-11-18 DIAGNOSIS — L97.223 NON-PRESSURE CHRONIC ULCER OF LEFT CALF WITH NECROSIS OF MUSCLE (HCC): Primary | ICD-10-CM

## 2022-11-18 PROCEDURE — 11043 DBRDMT MUSC&/FSCA 1ST 20/<: CPT

## 2022-11-18 PROCEDURE — 29581 APPL MULTLAYER CMPRN SYS LEG: CPT

## 2022-11-18 RX ORDER — CLOBETASOL PROPIONATE 0.5 MG/G
OINTMENT TOPICAL ONCE
OUTPATIENT
Start: 2022-11-18 | End: 2022-11-18

## 2022-11-18 RX ORDER — GENTAMICIN SULFATE 1 MG/G
OINTMENT TOPICAL ONCE
OUTPATIENT
Start: 2022-11-18 | End: 2022-11-18

## 2022-11-18 RX ORDER — BACITRACIN, NEOMYCIN, POLYMYXIN B 400; 3.5; 5 [USP'U]/G; MG/G; [USP'U]/G
OINTMENT TOPICAL ONCE
OUTPATIENT
Start: 2022-11-18 | End: 2022-11-18

## 2022-11-18 RX ORDER — LIDOCAINE HYDROCHLORIDE 20 MG/ML
JELLY TOPICAL ONCE
OUTPATIENT
Start: 2022-11-18 | End: 2022-11-18

## 2022-11-18 RX ORDER — BACITRACIN ZINC AND POLYMYXIN B SULFATE 500; 1000 [USP'U]/G; [USP'U]/G
OINTMENT TOPICAL ONCE
OUTPATIENT
Start: 2022-11-18 | End: 2022-11-18

## 2022-11-18 RX ORDER — LIDOCAINE 40 MG/G
CREAM TOPICAL ONCE
Status: DISCONTINUED | OUTPATIENT
Start: 2022-11-18 | End: 2022-11-19 | Stop reason: HOSPADM

## 2022-11-18 RX ORDER — LIDOCAINE HYDROCHLORIDE 40 MG/ML
SOLUTION TOPICAL ONCE
OUTPATIENT
Start: 2022-11-18 | End: 2022-11-18

## 2022-11-18 RX ORDER — BETAMETHASONE DIPROPIONATE 0.05 %
OINTMENT (GRAM) TOPICAL ONCE
OUTPATIENT
Start: 2022-11-18 | End: 2022-11-18

## 2022-11-18 RX ORDER — LIDOCAINE 50 MG/G
OINTMENT TOPICAL ONCE
OUTPATIENT
Start: 2022-11-18 | End: 2022-11-18

## 2022-11-18 RX ORDER — GINSENG 100 MG
CAPSULE ORAL ONCE
OUTPATIENT
Start: 2022-11-18 | End: 2022-11-18

## 2022-11-18 RX ORDER — LIDOCAINE 40 MG/G
CREAM TOPICAL ONCE
Status: CANCELLED | OUTPATIENT
Start: 2022-11-18 | End: 2022-11-18

## 2022-11-18 NOTE — PROGRESS NOTES
Jourdan Medrano  Progress Note and Procedure Note      Ricarda Epley  AGE: 67 y.o. GENDER: female  : 1950  TODAY'S DATE:  2022    Subjective:     Chief Complaint   Patient presents with    Wound Check         HISTORY of PRESENT ILLNESS HPI     Ricarda Epley is a 67 y.o. female who presents today for wound evaluation. History of Wound: Patient admits to having a surgical procedure to remove skin cancer at the end of 2022. She has not been able to heal the wound since that has been sent to the wound care center. She admits to having a previous injury to the leg and having discoloration in the area where the skin cancer was for many years. She denies current nausea, vomiting, fever, chills, shortness of breath or chest pain. She admits to being diabetic and smoking 1-1/2 packs of cigarettes daily. Wound Pain:  intermittent  Severity:  3 / 10   Wound Type:  venous, arterial, diabetic, and non-healing surgical  Modifying Factors:  edema, venous stasis, diabetes, smoking, arterial insufficiency, and decreased tissue oxygenation  Associated Signs/Symptoms:  edema, drainage, and pain        PAST MEDICAL HISTORY        Diagnosis Date    Cancer [facility])     Skin cancer Left lower leg    COPD (chronic obstructive pulmonary disease) ([facility] [address]     Diabetes mellitus ([facility] [address]     Hyperlipidemia        PAST SURGICAL HISTORY    Past Surgical History:   Procedure Laterality Date    ORTHOPEDIC SURGERY Left        FAMILY HISTORY    No family history on file.     SOCIAL HISTORY    Social History     Tobacco Use    Smoking status: Every Day     Packs/day: 1.50     Years: 40.00     Pack years: 60.00     Types: Cigarettes    Smokeless tobacco: Never   Vaping Use    Vaping Use: Never used   Substance Use Topics    Alcohol use: Not Currently    Drug use: Never       ALLERGIES    No Known Allergies    MEDICATIONS    Current Outpatient Medications on File Prior to Encounter   Medication Sig Dispense Refill collagenase (SANTYL) 250 UNIT/GM ointment Quantity sufficient for 30 days. Apply topically daily. Wounds (cm): Left lower leg- 3.7 x 2 60 g 1    Cyanocobalamin (VITAMIN B 12) 500 MCG TABS Take 5,000 mcg by mouth daily      vitamin C (ASCORBIC ACID) 500 MG tablet Take 500 mg by mouth daily      Calcium Carb-Cholecalciferol (CALCIUM 1000 + D) 1000-800 MG-UNIT TABS Take 2 tablets by mouth daily      Multiple Vitamin (MULTIVITAMIN PO) Take 1 tablet by mouth daily      albuterol sulfate HFA (PROVENTIL;VENTOLIN;PROAIR) 108 (90 Base) MCG/ACT inhaler Inhale 1 puff into the lungs every 6 hours as needed (Patient not taking: Reported on 11/4/2022)      acetaminophen-codeine (TYLENOL #3) 300-30 MG per tablet Take 1 tablet by mouth as needed. aspirin 81 MG EC tablet Take 81 mg by mouth daily      dulaglutide (TRULICITY) 1.5 VL/3.6GB SC injection Inject 0.5 mLs into the skin once a week      lisinopril (PRINIVIL;ZESTRIL) 2.5 MG tablet Take 2.5 mg by mouth daily      pravastatin (PRAVACHOL) 20 MG tablet Take 20 mg by mouth daily      sitaGLIPtan-metFORMIN (JANUMET)  MG per tablet Take 1 tablet by mouth daily      gentamicin (GARAMYCIN) 0.1 % cream Apply topically daily. 30 g 1     No current facility-administered medications on file prior to encounter. REVIEW OF SYSTEMS    Pertinent items are noted in HPI. Objective:      BP (!) 166/77   Pulse 71   Temp 97.3 °F (36.3 °C) (Infrared)   Resp 18     PHYSICAL EXAM    Vascular: Vascular status Impaired  palpable pedal pulses, right DP1/4 and PT1/4, left DP1/4 and PT1/4. CFT 3 seconds digits 1 to 5 bilateral.  Hair growthAbsent  both lower extremities and feet. Skin temperature is warm to cool from pretibial area to distal digits bilateral.  Exam is negative for rubor, pallor, cyanosis or signs of acute vascular compromise bilaterally. Exam is negative for edema bilateral lower extremity. Varicosities Absent  bilateral lower extremity.    Neuro: Neurologic status diminished bilateral with epicritic Present , proprioceptive Present, vibratory sensationPresent and protopathicPresent. DTRs Present bilateral Achilles. There were no reproducible neuritic symptoms on exam bilateral feet/ankles. Derm: Ulceration to left leg. Ecchymosis Absent  bilateral feet/foot. Musculoskeletal: No pain with debridement of wound 5/5 muscle strength in/eversion and dorsi/plantarflexion bilateral feet. No gross instability noted. 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 Devitalized Tissue Debrided:  fibrin, biofilm, slough, necrotic/eschar, and exudate    Pre Debridement Measurements:  Are located in the Wound Documentation Flow Sheet    Wound #: 1     Wound Care Documentation:  Wound 10/28/22 Leg Left; Lower;Medial #1 (Active)   Wound Image   10/28/22 0948   Wound Etiology Non-Healing Surgical 11/18/22 1006   Wound Cleansed Cleansed with saline 11/18/22 1006   Dressing/Treatment Antibacterial ointment;Dry dressing;Triad hydro/zinc oxide-based hydrophilic paste 49/35/32 2614   Wound Length (cm) 3.6 cm 11/18/22 1006   Wound Width (cm) 1.7 cm 11/18/22 1006   Wound Depth (cm) 0.3 cm 11/18/22 1006   Wound Surface Area (cm^2) 6.12 cm^2 11/18/22 1006   Change in Wound Size % (l*w) -4.62 11/18/22 1006   Wound Volume (cm^3) 1.836 cm^3 11/18/22 1006   Wound Healing % -5 11/18/22 1006   Post-Procedure Length (cm) 3.6 cm 11/18/22 1016 Post-Procedure Width (cm) 1.7 cm 11/18/22 1016   Post-Procedure Depth (cm) 0.3 cm 11/18/22 1016   Post-Procedure Surface Area (cm^2) 6.12 cm^2 11/18/22 1016   Post-Procedure Volume (cm^3) 1.836 cm^3 11/18/22 1016   Wound Assessment Bleeding 11/18/22 1016   Drainage Amount Moderate 11/18/22 1006   Drainage Description Yellow 11/18/22 1006   Odor None 11/18/22 1006   Aga-wound Assessment Hemosiderin staining (brown yellow) 11/18/22 1006   Margins Defined edges 11/18/22 1006   Wound Thickness Description not for Pressure Injury Full thickness 10/28/22 0948   Number of days: 21         Total Surface Area Debrided:  6.12 sq cm     Percentage of wound debrided 100%    Bleeding:  Minimal    Hemostasis Achieved:  by pressure    Procedural Pain:  0  / 10     Post Procedural Pain:  0 / 10     Response to treatment:  Well tolerated by patient. Plan:   Patient examined and evaluated  Wound sharply debrided without incident  Discussed appropriate diabetic diet  Again encouraged smoking decrease or cessation  Request the Pt smoke outside  Duplex ultrasound reviewed shows some deficiencies, discussed with Dr. Casandra Stevens and we will increase her compression on the leg. Multi layer compression wrap with triad and gentamicin cream.  Daily dressing changes with gentamicin cream and triad, prescription for Santyl if she can afford it  Admits to having her vascular studies follow-up on Monday with vascular surgery  The nature of the patient's condition was explained in depth.  The patient was informed that their compliance to the treatment plan is paramount to successful healing and prevention of further ulceration and/or infection     Discharge Treatment Daily dressing changes    Written Patient Discharge Instructions Given            Electronically signed by Aundrea Wiley DPM on 11/18/2022 at 10:38 AM

## 2022-11-18 NOTE — PROGRESS NOTES
Multilayer Compression Wrap   Below the Knee    NAME:  Jose Rios  YOB: 1950  MEDICAL RECORD NUMBER:  [id number]  DATE:  11/18/2022    Multilayer compression wrap: Applied moisturizing agent to dry skin as needed. Applied primary and secondary dressing as ordered. Applied multilayered dressing below the knee to left lower leg. Instructed patient/caregiver not to remove dressing and to keep it clean and dry. Instructed patient/caregiver on complications to report to provider, such as pain, numbness in toes, heavy drainage, and slippage of dressing. Instructed patient on purpose of compression dressing and on activity and exercise recommendations.      Applied  2 layer wrap from toes to knee overlapping each time    Electronically signed by ALEX ESPINO LPN on 60/85/8799 at 10:35 AM

## 2022-11-18 NOTE — PLAN OF CARE
Discharge instructions given. Patient verbalized understanding. Return to [facility] in 1 week(s).

## 2022-11-18 NOTE — DISCHARGE INSTRUCTIONS
[facility], [address]  Telephone: [phone]     Discharge Instructions     Important reminders:     **If you have any signs and symptoms of illness (Cough, fever, congestion, nausea, vomiting, diarrhea, etc.) please call the wound care center prior to your appointment. 1. Increase Protein intake for optimal wound healing  2. No added salt to reduce any swelling  3. If diabetic, maintain good glucose control  4. If you smoke, smoking prohibits wound healing, we ask that you refrain from smoking. 5. When taking antibiotics take the entire prescription as ordered. Do not stop taking until medication is all gone unless otherwise instructed. 6. Exercise as tolerated. 7. Keep weight off wounds and reposition every 2 hours if applicable. 8. If wound(s) is on your lower extremity, elevate legs to the level of the heart or above for 30 minutes 4-5 times a day and/or when sitting. Avoid standing for long periods of time. 9. Do not get wounds wet in bath or shower unless otherwise instructed by your physician. If your wound is on your foot or leg, you may purchase a cast bag. Please ask at the pharmacy. If Vascular testing is ordered, please call [address] [place] [address] [phone]) to schedule. Vascular tests ordered by Wound Care Physicians may take up to 2 hours to complete. Please keep that in mind when scheduling. If Vascular testing is scheduled, please bring supplies to replace your dressing after testing is done. The vascular department does not stock supplies. Wound:  Left leg     With each dressing change, rinse wounds with 0.9% Saline. (May use wound wash or soft contact solution. Both can be purchased at a local drug store). If unable to obtain saline, may use a gentle soap and water. Dressing care: NO Smoking. Do not get wet in the shower.  Mix Gentamicin cream & Triad, 4 x 4's, Coflex Calamine- these will be changed at your next visit unless they slide down or cause pain. If they slide, gets wet, or cause pain please call the wound care center, you may need to come in to be re-wrapped. If you are unable to get to your follow up appointment you will need to remove your wraps at home & place some kind of compression. Keep wound covered at all times    Compression Wraps  Location: Left lower leg below knee  Type: Coflex Calamine    Your doctor has ordered compression therapy for your wound. Compression bandages reduce the swelling, or edema, in your legs and prevent it from returning. The wound care staff will apply your compression wrap. It must be removed and re-applied at least weekly. As the swelling decreases, the boot no longer provides adequate compression and you need a new one. Once applied, you need to know how to take care of your compression wrap. The boot must stay dry. Do not get it wet in the shower or tub. You may do a partial bath, or you can cover the boot with a large plastic bag, secured at the top, so that no water can get in. Avoid standing in one place for long periods of time. If you must  one place, shift your weight and change positions often. If you have CHF, consult your doctor before following the next two recommendations for leg elevation. When sitting, elevate your legs on pillows, or put blocks under the foot of your bed. Your legs should be higher than your heart. If your boot becomes painful, or you notice an increase in swelling in your toes, numbness or tingling, or purple color to your toes, remove the wrap [facility] [address] [facility]. If it is after hours, call your doctor for instructions or go to the nearest emergency room.       Dr Valentino Pollard, Vascular Surgeon  - [facility]  [address], [address]  Phone# [phone]  Fax# [phone]        Home Care Agency/Facility:      Your wound-care supplies will be provided by: [employer] -   phone #: [phone]     Please note, depending on your insurance coverage, you may have out-of-pocket expenses for these supplies. Someone from the company should call you to confirm your order and discuss those potential costs before they ship your products -- please anticipate that call. If your out-of-pocket cost could be substantial, Many companies have financial hardship programs for patients who qualify, so please ask about that if you might need a hand. If you have any questions about your supplies or your potential out-of-pocket costs, or if you need to place an order for a refill of supplies (typically monthly), please call the company directly. Your  is Javon Miller     Follow up with Dr Luis Angel Whiteside In 1 week(s) in the wound care center. Wound Care Center Information: Should you experience any significant changes in your wound(s) or have questions about your wound care, please contact the [employer] at [phone] Monday  - Thursday 8:00 am - 4:00 pm and Friday 8:00 am - 1:00pm. If you need help with your wound outside these hours and cannot wait until we are again available, contact your PCP or go to the hospital emergency room. PLEASE NOTE: IF YOU ARE UNABLE TO OBTAIN WOUND SUPPLIES, CONTINUE TO USE THE SUPPLIES YOU HAVE AVAILABLE UNTIL YOU ARE ABLE TO REACH US. IT IS MOST IMPORTANT TO KEEP THE WOUND COVERED AT ALL TIMES. Patient Experience     Thank you for trusting us with your care. You may receive a survey from a company called [employer] asking for your feedback. We would appreciate it if you took a few minutes to share your experience.   Your input is very valuable to us

## 2022-11-21 NOTE — DISCHARGE INSTRUCTIONS
[facility], [address] [place] [address] Road  Telephone: [phone]     Discharge Instructions     Important reminders:     **If you have any signs and symptoms of illness (Cough, fever, congestion, nausea, vomiting, diarrhea, etc.) please call the wound care center prior to your appointment. 1. Increase Protein intake for optimal wound healing  2. No added salt to reduce any swelling  3. If diabetic, maintain good glucose control  4. If you smoke, smoking prohibits wound healing, we ask that you refrain from smoking. 5. When taking antibiotics take the entire prescription as ordered. Do not stop taking until medication is all gone unless otherwise instructed. 6. Exercise as tolerated. 7. Keep weight off wounds and reposition every 2 hours if applicable. 8. If wound(s) is on your lower extremity, elevate legs to the level of the heart or above for 30 minutes 4-5 times a day and/or when sitting. Avoid standing for long periods of time. 9. Do not get wounds wet in bath or shower unless otherwise instructed by your physician. If your wound is on your foot or leg, you may purchase a cast bag. Please ask at the pharmacy. If Vascular testing is ordered, please call [address] [place] [address] ([phone]) to schedule. Vascular tests ordered by Wound Care Physicians may take up to 2 hours to complete. Please keep that in mind when scheduling. If Vascular testing is scheduled, please bring supplies to replace your dressing after testing is done. The vascular department does not stock supplies. Wound:  Left leg     With each dressing change, rinse wounds with 0.9% Saline. (May use wound wash or soft contact solution. Both can be purchased at a local drug store). If unable to obtain saline, may use a gentle soap and water. Dressing care: NO Smoking. Do not get wet in the shower. Betamethasone to leg under wrap.  Mix Gentamicin cream & Triad, 4 x 4's, Coflex Calamine- these will be changed at your next visit unless they slide down or cause pain. If they slide, gets wet, or cause pain please call the wound care center, you may need to come in to be re-wrapped. If you are unable to get to your follow up appointment you will need to remove your wraps at home & place some kind of compression. Keep wound covered at all times     Compression Wraps  Location: Left lower leg below knee  Type: Coflex Calamine     Your doctor has ordered compression therapy for your wound. Compression bandages reduce the swelling, or edema, in your legs and prevent it from returning. The wound care staff will apply your compression wrap. It must be removed and re-applied at least weekly. As the swelling decreases, the boot no longer provides adequate compression and you need a new one. Once applied, you need to know how to take care of your compression wrap. The boot must stay dry. Do not get it wet in the shower or tub. You may do a partial bath, or you can cover the boot with a large plastic bag, secured at the top, so that no water can get in. Avoid standing in one place for long periods of time. If you must  one place, shift your weight and change positions often. If you have CHF, consult your doctor before following the next two recommendations for leg elevation. When sitting, elevate your legs on pillows, or put blocks under the foot of your bed. Your legs should be higher than your heart. If your boot becomes painful, or you notice an increase in swelling in your toes, numbness or tingling, or purple color to your toes, remove the wrap and call the [facility] [address]. If it is after hours, call your doctor for instructions or go to the nearest emergency room.       Dr Raúl Cloud, Vascular Surgeon  - [facility] for studies  [address] [place] [address], [address]  Phone# [phone]  Fax# [phone]        Home Care Agency/Facility:      Your wound-care supplies will be provided by: [employer] -   phone #: [phone]     Please note, depending on your insurance coverage, you may have out-of-pocket expenses for these supplies. Someone from the company should call you to confirm your order and discuss those potential costs before they ship your products -- please anticipate that call. If your out-of-pocket cost could be substantial, Many companies have financial hardship programs for patients who qualify, so please ask about that if you might need a hand. If you have any questions about your supplies or your potential out-of-pocket costs, or if you need to place an order for a refill of supplies (typically monthly), please call the company directly. Your  is Venkat Sanabria     Follow up with Dr Rachel Colon In 1 week(s) in the wound care center. Wound Care Center Information: Should you experience any significant changes in your wound(s) or have questions about your wound care, please contact the [employer] at [phone] Monday  - Thursday 8:00 am - 4:00 pm and Friday 8:00 am - 1:00pm. If you need help with your wound outside these hours and cannot wait until we are again available, contact your PCP or go to the hospital emergency room. PLEASE NOTE: IF YOU ARE UNABLE TO OBTAIN WOUND SUPPLIES, CONTINUE TO USE THE SUPPLIES YOU HAVE AVAILABLE UNTIL YOU ARE ABLE TO REACH US. IT IS MOST IMPORTANT TO KEEP THE WOUND COVERED AT ALL TIMES. Patient Experience     Thank you for trusting us with your care. You may receive a survey from a company called [employer] asking for your feedback. We would appreciate it if you took a few minutes to share your experience.   Your input is very valuable to us

## 2022-11-23 ENCOUNTER — HOSPITAL ENCOUNTER (OUTPATIENT)
Dept: WOUND CARE | Age: 72
Discharge: HOME | End: 2022-11-23
Payer: COMMERCIAL

## 2022-11-23 VITALS
TEMPERATURE: 97.3 F | SYSTOLIC BLOOD PRESSURE: 165 MMHG | RESPIRATION RATE: 18 BRPM | DIASTOLIC BLOOD PRESSURE: 72 MMHG | HEART RATE: 91 BPM

## 2022-11-23 DIAGNOSIS — L97.223 NON-PRESSURE CHRONIC ULCER OF LEFT CALF WITH NECROSIS OF MUSCLE (HCC): Primary | ICD-10-CM

## 2022-11-23 PROCEDURE — 11043 DBRDMT MUSC&/FSCA 1ST 20/<: CPT

## 2022-11-23 RX ORDER — LIDOCAINE HYDROCHLORIDE 40 MG/ML
SOLUTION TOPICAL ONCE
OUTPATIENT
Start: 2022-11-23 | End: 2022-11-23

## 2022-11-23 RX ORDER — BACITRACIN ZINC AND POLYMYXIN B SULFATE 500; 1000 [USP'U]/G; [USP'U]/G
OINTMENT TOPICAL ONCE
OUTPATIENT
Start: 2022-11-23 | End: 2022-11-23

## 2022-11-23 RX ORDER — GINSENG 100 MG
CAPSULE ORAL ONCE
OUTPATIENT
Start: 2022-11-23 | End: 2022-11-23

## 2022-11-23 RX ORDER — LIDOCAINE 40 MG/G
CREAM TOPICAL ONCE
OUTPATIENT
Start: 2022-11-23 | End: 2022-11-23

## 2022-11-23 RX ORDER — LIDOCAINE HYDROCHLORIDE 20 MG/ML
JELLY TOPICAL ONCE
OUTPATIENT
Start: 2022-11-23 | End: 2022-11-23

## 2022-11-23 RX ORDER — BACITRACIN, NEOMYCIN, POLYMYXIN B 400; 3.5; 5 [USP'U]/G; MG/G; [USP'U]/G
OINTMENT TOPICAL ONCE
OUTPATIENT
Start: 2022-11-23 | End: 2022-11-23

## 2022-11-23 RX ORDER — LIDOCAINE 50 MG/G
OINTMENT TOPICAL ONCE
OUTPATIENT
Start: 2022-11-23 | End: 2022-11-23

## 2022-11-23 RX ORDER — CLOBETASOL PROPIONATE 0.5 MG/G
OINTMENT TOPICAL ONCE
OUTPATIENT
Start: 2022-11-23 | End: 2022-11-23

## 2022-11-23 RX ORDER — BETAMETHASONE DIPROPIONATE 0.05 %
OINTMENT (GRAM) TOPICAL ONCE
OUTPATIENT
Start: 2022-11-23 | End: 2022-11-23

## 2022-11-23 RX ORDER — LIDOCAINE 40 MG/G
CREAM TOPICAL ONCE
Status: DISCONTINUED | OUTPATIENT
Start: 2022-11-23 | End: 2022-11-24 | Stop reason: HOSPADM

## 2022-11-23 RX ORDER — GENTAMICIN SULFATE 1 MG/G
OINTMENT TOPICAL ONCE
OUTPATIENT
Start: 2022-11-23 | End: 2022-11-23

## 2022-11-23 NOTE — PROGRESS NOTES
Jourdan Medrano  Progress Note and Procedure Note      Butch Gill  AGE: 67 y.o. GENDER: female  : 1950  TODAY'S DATE:  2022    Subjective:     Chief Complaint   Patient presents with    Wound Check         HISTORY of PRESENT ILLNESS HPI     Butch Gill is a 67 y.o. female who presents today for wound evaluation. History of Wound: Patient admits to having a surgical procedure to remove skin cancer at the end of 2022. She has not been able to heal the wound since that has been sent to the wound care center. She admits to having a previous injury to the leg and having discoloration in the area where the skin cancer was for many years. She denies current nausea, vomiting, fever, chills, shortness of breath or chest pain. She admits to being diabetic and smoking 1-1/2 packs of cigarettes daily. Wound Pain:  intermittent  Severity:  3 / 10   Wound Type:  venous, arterial, diabetic, and non-healing surgical  Modifying Factors:  edema, venous stasis, diabetes, smoking, arterial insufficiency, and decreased tissue oxygenation  Associated Signs/Symptoms:  edema, drainage, and pain        PAST MEDICAL HISTORY        Diagnosis Date    Cancer [facility])     Skin cancer Left lower leg    COPD (chronic obstructive pulmonary disease) ([facility] [address]     Diabetes mellitus ([facility] [address]     Hyperlipidemia        PAST SURGICAL HISTORY    Past Surgical History:   Procedure Laterality Date    ORTHOPEDIC SURGERY Left        FAMILY HISTORY    No family history on file.     SOCIAL HISTORY    Social History     Tobacco Use    Smoking status: Every Day     Packs/day: 1.50     Years: 40.00     Pack years: 60.00     Types: Cigarettes    Smokeless tobacco: Never   Vaping Use    Vaping Use: Never used   Substance Use Topics    Alcohol use: Not Currently    Drug use: Never       ALLERGIES    No Known Allergies    MEDICATIONS    Current Outpatient Medications on File Prior to Encounter   Medication Sig Dispense Refill collagenase (SANTYL) 250 UNIT/GM ointment Quantity sufficient for 30 days. Apply topically daily. Wounds (cm): Left lower leg- 3.7 x 2 60 g 1    Cyanocobalamin (VITAMIN B 12) 500 MCG TABS Take 5,000 mcg by mouth daily      vitamin C (ASCORBIC ACID) 500 MG tablet Take 500 mg by mouth daily      Calcium Carb-Cholecalciferol (CALCIUM 1000 + D) 1000-800 MG-UNIT TABS Take 2 tablets by mouth daily      Multiple Vitamin (MULTIVITAMIN PO) Take 1 tablet by mouth daily      albuterol sulfate HFA (PROVENTIL;VENTOLIN;PROAIR) 108 (90 Base) MCG/ACT inhaler Inhale 1 puff into the lungs every 6 hours as needed (Patient not taking: Reported on 11/4/2022)      acetaminophen-codeine (TYLENOL #3) 300-30 MG per tablet Take 1 tablet by mouth as needed. aspirin 81 MG EC tablet Take 81 mg by mouth daily      dulaglutide (TRULICITY) 1.5 LB/6.5PS SC injection Inject 0.5 mLs into the skin once a week      lisinopril (PRINIVIL;ZESTRIL) 2.5 MG tablet Take 2.5 mg by mouth daily      pravastatin (PRAVACHOL) 20 MG tablet Take 20 mg by mouth daily      sitaGLIPtan-metFORMIN (JANUMET)  MG per tablet Take 1 tablet by mouth daily      gentamicin (GARAMYCIN) 0.1 % cream Apply topically daily. 30 g 1     No current facility-administered medications on file prior to encounter. REVIEW OF SYSTEMS    Pertinent items are noted in HPI. Objective:      BP (!) 165/72   Pulse 91   Temp 97.3 °F (36.3 °C) (Infrared)   Resp 18     PHYSICAL EXAM    Vascular: Vascular status Impaired  palpable pedal pulses, right DP1/4 and PT1/4, left DP1/4 and PT1/4. CFT 3 seconds digits 1 to 5 bilateral.  Hair growthAbsent  both lower extremities and feet. Skin temperature is warm to cool from pretibial area to distal digits bilateral.  Exam is negative for rubor, pallor, cyanosis or signs of acute vascular compromise bilaterally. Exam is negative for edema bilateral lower extremity. Varicosities Absent  bilateral lower extremity.    Neuro: Neurologic status diminished bilateral with epicritic Present , proprioceptive Present, vibratory sensationPresent and protopathicPresent. DTRs Present bilateral Achilles. There were no reproducible neuritic symptoms on exam bilateral feet/ankles. Derm: Ulceration to left leg. Ecchymosis Absent  bilateral feet/foot. Musculoskeletal: No pain with debridement of wound 5/5 muscle strength in/eversion and dorsi/plantarflexion bilateral feet. No gross instability noted. 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 Devitalized Tissue Debrided:  fibrin, biofilm, slough, necrotic/eschar, and exudate    Pre Debridement Measurements:  Are located in the Wound Documentation Flow Sheet    Wound #: 1     Wound Care Documentation:  Wound 10/28/22 Leg Left; Lower;Medial #1 (Active)   Wound Image   10/28/22 0948   Wound Etiology Non-Healing Surgical 11/23/22 1340   Wound Cleansed Cleansed with saline 11/23/22 1340   Dressing/Treatment Antibacterial ointment;Dry dressing;Triad hydro/zinc oxide-based hydrophilic paste 31/29/66 8037   Wound Length (cm) 3.6 cm 11/23/22 1340   Wound Width (cm) 1.7 cm 11/23/22 1340   Wound Depth (cm) 0.3 cm 11/23/22 1340   Wound Surface Area (cm^2) 6.12 cm^2 11/23/22 1340   Change in Wound Size % (l*w) -4.62 11/23/22 1340   Wound Volume (cm^3) 1.836 cm^3 11/23/22 1340   Wound Healing % -5 11/23/22 1340 Post-Procedure Length (cm) 3.6 cm 11/23/22 1403   Post-Procedure Width (cm) 1.7 cm 11/23/22 1403   Post-Procedure Depth (cm) 0.3 cm 11/23/22 1403   Post-Procedure Surface Area (cm^2) 6.12 cm^2 11/23/22 1403   Post-Procedure Volume (cm^3) 1.836 cm^3 11/23/22 1403   Wound Assessment Bleeding 11/23/22 1403   Drainage Amount Moderate 11/23/22 1340   Drainage Description Yellow; Thick 11/23/22 1340   Odor None 11/23/22 1340   Aga-wound Assessment Hemosiderin staining (brown yellow) 11/23/22 1340   Margins Defined edges 11/23/22 1340   Wound Thickness Description not for Pressure Injury Full thickness 10/28/22 0948   Number of days: 26         Total Surface Area Debrided:  6.12 sq cm     Percentage of wound debrided 100%    Bleeding:  Minimal    Hemostasis Achieved:  by pressure    Procedural Pain:  0  / 10     Post Procedural Pain:  0 / 10     Response to treatment:  Well tolerated by patient. Plan:   Patient examined and evaluated  Wound sharply debrided without incident  Discussed appropriate diabetic diet  Again encouraged smoking decrease or cessation  Request the Pt smoke outside  Duplex ultrasound reviewed shows some deficiencies, discussed with Dr. Robb Barrett and we will increase her compression on the leg. Multi layer compression wrap with triad and gentamicin cream.  Admits to having her vascular studies follow-up on Monday with vascular surgery  The nature of the patient's condition was explained in depth.  The patient was informed that their compliance to the treatment plan is paramount to successful healing and prevention of further ulceration and/or infection     Discharge Treatment Daily dressing changes    Written Patient Discharge Instructions Given            Electronically signed by Soila Clifton DPM on 11/23/2022 at 2:28 PM

## 2022-11-23 NOTE — PLAN OF CARE
Discharge instructions given. Patient verbalized understanding. Return to [address] [place] [address],[address] in 1 week(s).

## 2022-11-23 NOTE — PROGRESS NOTES
Multilayer Compression Wrap   Below the Knee    NAME:  Vandana Masterson  YOB: 1950  MEDICAL RECORD NUMBER:  [id number]  DATE:  11/23/2022    Multilayer compression wrap: Applied primary and secondary dressing as ordered. Applied multilayered dressing below the knee to left lower leg. Instructed patient/caregiver not to remove dressing and to keep it clean and dry. Instructed patient/caregiver on complications to report to provider, such as pain, numbness in toes, heavy drainage, and slippage of dressing. Instructed patient on purpose of compression dressing and on activity and exercise recommendations.      Applied  2 layer wrap from toes to knee overlapping each time    Electronically signed by Paddy Ormond, RN on 11/23/2022 at 3:51 PM

## 2022-12-01 NOTE — DISCHARGE INSTRUCTIONS
[facility], [address]  Telephone: [phone]     Discharge Instructions     Important reminders:     **If you have any signs and symptoms of illness (Cough, fever, congestion, nausea, vomiting, diarrhea, etc.) please call the wound care center prior to your appointment. 1. Increase Protein intake for optimal wound healing  2. No added salt to reduce any swelling  3. If diabetic, maintain good glucose control  4. If you smoke, smoking prohibits wound healing, we ask that you refrain from smoking. 5. When taking antibiotics take the entire prescription as ordered. Do not stop taking until medication is all gone unless otherwise instructed. 6. Exercise as tolerated. 7. Keep weight off wounds and reposition every 2 hours if applicable. 8. If wound(s) is on your lower extremity, elevate legs to the level of the heart or above for 30 minutes 4-5 times a day and/or when sitting. Avoid standing for long periods of time. 9. Do not get wounds wet in bath or shower unless otherwise instructed by your physician. If your wound is on your foot or leg, you may purchase a cast bag. Please ask at the pharmacy. If Vascular testing is ordered, please call [address] [place] [address] [phone]) to schedule. Vascular tests ordered by Wound Care Physicians may take up to 2 hours to complete. Please keep that in mind when scheduling. If Vascular testing is scheduled, please bring supplies to replace your dressing after testing is done. The vascular department does not stock supplies. Wound:  Left leg     With each dressing change, rinse wounds with 0.9% Saline. (May use wound wash or soft contact solution. Both can be purchased at a local drug store). If unable to obtain saline, may use a gentle soap and water. Dressing care: NO Smoking. Do not get wet in the shower. Betamethasone to leg under wrap.  Mix Gentamicin cream & Triad, 4 x 4's, Coflex Calamine- these will be changed at your next visit unless they slide down or cause pain. If they slide, gets wet, or cause pain please call the wound care center, you may need to come in to be re-wrapped. If you are unable to get to your follow up appointment you will need to remove your wraps at home & place some kind of compression. Keep wound covered at all times     Compression Wraps  Location: Left lower leg below knee  Type: Coflex Calamine     Your doctor has ordered compression therapy for your wound. Compression bandages reduce the swelling, or edema, in your legs and prevent it from returning. The wound care staff will apply your compression wrap. It must be removed and re-applied at least weekly. As the swelling decreases, the boot no longer provides adequate compression and you need a new one. Once applied, you need to know how to take care of your compression wrap. The boot must stay dry. Do not get it wet in the shower or tub. You may do a partial bath, or you can cover the boot with a large plastic bag, secured at the top, so that no water can get in. Avoid standing in one place for long periods of time. If you must  one place, shift your weight and change positions often. If you have CHF, consult your doctor before following the next two recommendations for leg elevation. When sitting, elevate your legs on pillows, or put blocks under the foot of your bed. Your legs should be higher than your heart. If your boot becomes painful, or you notice an increase in swelling in your toes, numbness or tingling, or purple color to your toes, remove the wrap and call the [facility] [address]. If it is after hours, call your doctor for instructions or go to the nearest emergency room.       Dr Rene Gonzalez, Vascular Surgeon  - Yamila Dodd for studies  [address] [place] [address], [address]  Phone# [phone]  Fax# [phone]        Home Care Agency/Facility:      Your wound-care supplies will be provided by: [employer] -   phone #: [phone]     Please note, depending on your insurance coverage, you may have out-of-pocket expenses for these supplies. Someone from the company should call you to confirm your order and discuss those potential costs before they ship your products -- please anticipate that call. If your out-of-pocket cost could be substantial, Many companies have financial hardship programs for patients who qualify, so please ask about that if you might need a hand. If you have any questions about your supplies or your potential out-of-pocket costs, or if you need to place an order for a refill of supplies (typically monthly), please call the company directly. Your  is Anusha Harp     Follow up with Dr Turner Gil In 1 week(s) in the wound care center. Dr Shantel Ernst next Tuesday        [address] [facility] Information: Should you experience any significant changes in your wound(s) or have questions about your wound care, please contact the [facility] at [phone] Monday  - Thursday 8:00 am - 4:00 pm and Friday 8:00 am - 1:00pm. If you need help with your wound outside these hours and cannot wait until we are again available, contact your PCP or go to the hospital emergency room. PLEASE NOTE: IF YOU ARE UNABLE TO OBTAIN WOUND SUPPLIES, CONTINUE TO USE THE SUPPLIES YOU HAVE AVAILABLE UNTIL YOU ARE ABLE TO REACH US. IT IS MOST IMPORTANT TO KEEP THE WOUND COVERED AT ALL TIMES. Patient Experience     Thank you for trusting us with your care. You may receive a survey from a company called [employer] asking for your feedback. We would appreciate it if you took a few minutes to share your experience.   Your input is very valuable to us

## 2022-12-02 ENCOUNTER — HOSPITAL ENCOUNTER (OUTPATIENT)
Dept: WOUND CARE | Age: 72
Discharge: HOME | End: 2022-12-02
Payer: COMMERCIAL

## 2022-12-02 VITALS
DIASTOLIC BLOOD PRESSURE: 72 MMHG | SYSTOLIC BLOOD PRESSURE: 182 MMHG | RESPIRATION RATE: 16 BRPM | HEART RATE: 82 BPM | TEMPERATURE: 96.7 F

## 2022-12-02 DIAGNOSIS — L97.223 NON-PRESSURE CHRONIC ULCER OF LEFT CALF WITH NECROSIS OF MUSCLE (HCC): Primary | ICD-10-CM

## 2022-12-02 PROCEDURE — 11043 DBRDMT MUSC&/FSCA 1ST 20/<: CPT

## 2022-12-02 PROCEDURE — 29581 APPL MULTLAYER CMPRN SYS LEG: CPT

## 2022-12-02 RX ORDER — LIDOCAINE 50 MG/G
OINTMENT TOPICAL ONCE
OUTPATIENT
Start: 2022-12-02 | End: 2022-12-02

## 2022-12-02 RX ORDER — LIDOCAINE 40 MG/G
CREAM TOPICAL ONCE
Status: DISCONTINUED | OUTPATIENT
Start: 2022-12-02 | End: 2022-12-03 | Stop reason: HOSPADM

## 2022-12-02 RX ORDER — CLOBETASOL PROPIONATE 0.5 MG/G
OINTMENT TOPICAL ONCE
OUTPATIENT
Start: 2022-12-02 | End: 2022-12-02

## 2022-12-02 RX ORDER — LIDOCAINE HYDROCHLORIDE 20 MG/ML
JELLY TOPICAL ONCE
OUTPATIENT
Start: 2022-12-02 | End: 2022-12-02

## 2022-12-02 RX ORDER — LIDOCAINE HYDROCHLORIDE 40 MG/ML
SOLUTION TOPICAL ONCE
OUTPATIENT
Start: 2022-12-02 | End: 2022-12-02

## 2022-12-02 RX ORDER — GENTAMICIN SULFATE 1 MG/G
OINTMENT TOPICAL ONCE
OUTPATIENT
Start: 2022-12-02 | End: 2022-12-02

## 2022-12-02 RX ORDER — BACITRACIN, NEOMYCIN, POLYMYXIN B 400; 3.5; 5 [USP'U]/G; MG/G; [USP'U]/G
OINTMENT TOPICAL ONCE
OUTPATIENT
Start: 2022-12-02 | End: 2022-12-02

## 2022-12-02 RX ORDER — BACITRACIN ZINC AND POLYMYXIN B SULFATE 500; 1000 [USP'U]/G; [USP'U]/G
OINTMENT TOPICAL ONCE
OUTPATIENT
Start: 2022-12-02 | End: 2022-12-02

## 2022-12-02 RX ORDER — LIDOCAINE 40 MG/G
CREAM TOPICAL ONCE
OUTPATIENT
Start: 2022-12-02 | End: 2022-12-02

## 2022-12-02 RX ORDER — BETAMETHASONE DIPROPIONATE 0.05 %
OINTMENT (GRAM) TOPICAL ONCE
OUTPATIENT
Start: 2022-12-02 | End: 2022-12-02

## 2022-12-02 RX ORDER — GINSENG 100 MG
CAPSULE ORAL ONCE
OUTPATIENT
Start: 2022-12-02 | End: 2022-12-02

## 2022-12-02 NOTE — PROGRESS NOTES
Jourdan Medrano  Progress Note and Procedure Note      Roger Ramirez  AGE: 67 y.o. GENDER: female  : 1950  TODAY'S DATE:  2022    Subjective:     Chief Complaint   Patient presents with    Wound Check     Left lower extremity         HISTORY of PRESENT ILLNESS HPI     Roger Ramirez is a 67 y.o. female who presents today for wound evaluation. History of Wound: Patient admits to having a surgical procedure to remove skin cancer at the end of 2022. She has not been able to heal the wound since the surgery. She admits to having a previous injury to the leg and having discoloration in the area where the skin cancer was for many years. S  She admits to being diabetic and smoking 1-1/2 packs of cigarettes daily. She has left the dressing intact without incident. She admits to some itching. She denies current nausea, vomiting, fever, chills, shortness of breath or chest pain. Wound Pain:  intermittent  Severity:  3 / 10   Wound Type:  venous, arterial, diabetic, and non-healing surgical  Modifying Factors:  edema, venous stasis, diabetes, smoking, arterial insufficiency, and decreased tissue oxygenation  Associated Signs/Symptoms:  edema, drainage, and pain        PAST MEDICAL HISTORY        Diagnosis Date    Cancer [facility])     Skin cancer Left lower leg    COPD (chronic obstructive pulmonary disease) ([facility] [address]     Diabetes mellitus ([facility] [address]     Hyperlipidemia        PAST SURGICAL HISTORY    Past Surgical History:   Procedure Laterality Date    ORTHOPEDIC SURGERY Left        FAMILY HISTORY    History reviewed. No pertinent family history.     SOCIAL HISTORY    Social History     Tobacco Use    Smoking status: Every Day     Packs/day: 1.50     Years: 40.00     Pack years: 60.00     Types: Cigarettes    Smokeless tobacco: Never   Vaping Use    Vaping Use: Never used   Substance Use Topics    Alcohol use: Not Currently    Drug use: Never       ALLERGIES    No Known Allergies    MEDICATIONS    Current Outpatient Medications on File Prior to Encounter   Medication Sig Dispense Refill    collagenase (SANTYL) 250 UNIT/GM ointment Quantity sufficient for 30 days. Apply topically daily. Wounds (cm): Left lower leg- 3.7 x 2 60 g 1    Cyanocobalamin (VITAMIN B 12) 500 MCG TABS Take 5,000 mcg by mouth daily      vitamin C (ASCORBIC ACID) 500 MG tablet Take 500 mg by mouth daily      Calcium Carb-Cholecalciferol (CALCIUM 1000 + D) 1000-800 MG-UNIT TABS Take 2 tablets by mouth daily      Multiple Vitamin (MULTIVITAMIN PO) Take 1 tablet by mouth daily      albuterol sulfate HFA (PROVENTIL;VENTOLIN;PROAIR) 108 (90 Base) MCG/ACT inhaler Inhale 1 puff into the lungs every 6 hours as needed (Patient not taking: Reported on 11/4/2022)      acetaminophen-codeine (TYLENOL #3) 300-30 MG per tablet Take 1 tablet by mouth as needed. aspirin 81 MG EC tablet Take 81 mg by mouth daily      dulaglutide (TRULICITY) 1.5 ZA/7.6MX SC injection Inject 0.5 mLs into the skin once a week      lisinopril (PRINIVIL;ZESTRIL) 2.5 MG tablet Take 2.5 mg by mouth daily      pravastatin (PRAVACHOL) 20 MG tablet Take 20 mg by mouth daily      sitaGLIPtan-metFORMIN (JANUMET)  MG per tablet Take 1 tablet by mouth daily      gentamicin (GARAMYCIN) 0.1 % cream Apply topically daily. 30 g 1     No current facility-administered medications on file prior to encounter. REVIEW OF SYSTEMS    Pertinent items are noted in HPI. Objective:      BP (!) 182/72   Pulse 82   Temp (!) 96.7 °F (35.9 °C) (Infrared)   Resp 16     PHYSICAL EXAM    Vascular: Vascular status Impaired  palpable pedal pulses, right DP1/4 and PT1/4, left DP1/4 and PT1/4. CFT 3 seconds digits 1 to 5 bilateral.  Hair growthAbsent  both lower extremities and feet. Skin temperature is warm to cool from pretibial area to distal digits bilateral.  Exam is negative for rubor, pallor, cyanosis or signs of acute vascular compromise bilaterally. Exam is negative for edema bilateral lower extremity. Varicosities Absent  bilateral lower extremity. Neuro: Neurologic status diminished bilateral with epicritic Present , proprioceptive Present, vibratory sensationPresent and protopathicPresent. DTRs Present bilateral Achilles. There were no reproducible neuritic symptoms on exam bilateral feet/ankles. Derm: Ulceration to left leg. Ecchymosis Absent  bilateral feet/foot. Musculoskeletal: No pain with debridement of wound 5/5 muscle strength in/eversion and dorsi/plantarflexion bilateral feet. No gross instability noted. 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 Devitalized Tissue Debrided:  fibrin, biofilm, slough, necrotic/eschar, and exudate    Pre Debridement Measurements:  Are located in the Wound Documentation Flow Sheet    Wound #: 1    Wound Care Documentation:  Wound 10/28/22 Leg Left; Lower;Medial #1 (Active)   Wound Image   10/28/22 0948   Wound Etiology Non-Healing Surgical 12/02/22 1005   Wound Cleansed Cleansed with saline; Wound cleanser 12/02/22 1005   Dressing/Treatment Antibacterial ointment;Dry dressing;Triad hydro/zinc oxide-based hydrophilic paste 89/16/12 4222   Wound Length (cm) 3 cm 12/02/22 1005   Wound Width (cm) 1.1 cm 12/02/22 1005   Wound Depth (cm) 0.2 cm 12/02/22 1005   Wound Surface Area (cm^2) 3.3 cm^2 12/02/22 1005   Change in Wound Size % (l*w) 43.59 12/02/22 1005   Wound Volume (cm^3) 0.66 cm^3 12/02/22 1005   Wound Healing % 62 12/02/22 1005   Post-Procedure Length (cm) 3 cm 12/02/22 1034   Post-Procedure Width (cm) 1.1 cm 12/02/22 1034   Post-Procedure Depth (cm) 0.2 cm 12/02/22 1034   Post-Procedure Surface Area (cm^2) 3.3 cm^2 12/02/22 1034   Post-Procedure Volume (cm^3) 0.66 cm^3 12/02/22 1034   Wound Assessment Bleeding 12/02/22 1034   Drainage Amount Moderate 12/02/22 1005   Drainage Description Serosanguinous; Yellow 12/02/22 1005   Odor None 12/02/22 1005   Aga-wound Assessment Intact 12/02/22 1005   Margins Defined edges 12/02/22 1005   Wound Thickness Description not for Pressure Injury Full thickness 10/28/22 0948   Number of days: 35         Total Surface Area Debrided:  3.3 sq cm     Percentage of wound debrided 100%    Bleeding:  Minimal    Hemostasis Achieved:  by pressure    Procedural Pain:  0  / 10     Post Procedural Pain:  0 / 10     Response to treatment:  Well tolerated by patient. Plan:   Patient examined and evaluated  Wound sharply debrided without incident  Discussed appropriate diabetic diet  Again encouraged smoking decrease or cessation  Request the Pt smoke outside  Multi layer compression wrap with triad and gentamicin cream.  Admits to having her vascular studies follow-up on Monday with vascular surgery  The nature of the patient's condition was explained in depth.  The patient was informed that their compliance to the treatment plan is paramount to successful healing and prevention of further ulceration and/or infection     Discharge Treatment Daily dressing changes    Written Patient Discharge Instructions Given            Electronically signed by Logan Crowe DPM on 12/2/2022 at 11:06 AM

## 2022-12-02 NOTE — PLAN OF CARE
Discharge instructions given. Patient verbalized understanding. Return to [address] [place] [address],[address] in 1 week(s).

## 2022-12-02 NOTE — DISCHARGE INSTRUCTIONS
[facility], [address]  Telephone: [phone]     Discharge Instructions     Important reminders:     **If you have any signs and symptoms of illness (Cough, fever, congestion, nausea, vomiting, diarrhea, etc.) please call the wound care center prior to your appointment. 1. Increase Protein intake for optimal wound healing  2. No added salt to reduce any swelling  3. If diabetic, maintain good glucose control  4. If you smoke, smoking prohibits wound healing, we ask that you refrain from smoking. 5. When taking antibiotics take the entire prescription as ordered. Do not stop taking until medication is all gone unless otherwise instructed. 6. Exercise as tolerated. 7. Keep weight off wounds and reposition every 2 hours if applicable. 8. If wound(s) is on your lower extremity, elevate legs to the level of the heart or above for 30 minutes 4-5 times a day and/or when sitting. Avoid standing for long periods of time. 9. Do not get wounds wet in bath or shower unless otherwise instructed by your physician. If your wound is on your foot or leg, you may purchase a cast bag. Please ask at the pharmacy. If Vascular testing is ordered, please call [address] [place] [address] [phone]) to schedule. Vascular tests ordered by Wound Care Physicians may take up to 2 hours to complete. Please keep that in mind when scheduling. If Vascular testing is scheduled, please bring supplies to replace your dressing after testing is done. The vascular department does not stock supplies. Wound:  Left leg     With each dressing change, rinse wounds with 0.9% Saline. (May use wound wash or soft contact solution. Both can be purchased at a local drug store). If unable to obtain saline, may use a gentle soap and water. Dressing care: NO Smoking. Do not get wet in the shower. Betamethasone to leg under wrap.  Mix Gentamicin cream & Triad, 4 x 4's, Coflex Calamine- these will be changed at your next visit unless they slide down or cause pain. If they slide, gets wet, or cause pain please call the wound care center, you may need to come in to be re-wrapped. If you are unable to get to your follow up appointment you will need to remove your wraps at home & place some kind of compression. Keep wound covered at all times     Compression Wraps  Location: Left lower leg below knee  Type: Coflex Calamine     Your doctor has ordered compression therapy for your wound. Compression bandages reduce the swelling, or edema, in your legs and prevent it from returning. The wound care staff will apply your compression wrap. It must be removed and re-applied at least weekly. As the swelling decreases, the boot no longer provides adequate compression and you need a new one. Once applied, you need to know how to take care of your compression wrap. The boot must stay dry. Do not get it wet in the shower or tub. You may do a partial bath, or you can cover the boot with a large plastic bag, secured at the top, so that no water can get in. Avoid standing in one place for long periods of time. If you must  one place, shift your weight and change positions often. If you have CHF, consult your doctor before following the next two recommendations for leg elevation. When sitting, elevate your legs on pillows, or put blocks under the foot of your bed. Your legs should be higher than your heart. If your boot becomes painful, or you notice an increase in swelling in your toes, numbness or tingling, or purple color to your toes, remove the wrap and call the [facility] [address]. If it is after hours, call your doctor for instructions or go to the nearest emergency room.       Dr Brittany Wu, Vascular Surgeon  - Percy Felton for studies  [address] [place] [address], [address]  Phone# [phone]  Fax# [phone]        Home Care Agency/Facility:      Your wound-care supplies will be provided by: [employer] -   phone #: [phone]     Please note, depending on your insurance coverage, you may have out-of-pocket expenses for these supplies. Someone from the company should call you to confirm your order and discuss those potential costs before they ship your products -- please anticipate that call. If your out-of-pocket cost could be substantial, Many companies have financial hardship programs for patients who qualify, so please ask about that if you might need a hand. If you have any questions about your supplies or your potential out-of-pocket costs, or if you need to place an order for a refill of supplies (typically monthly), please call the company directly. Your  is Diego Helton     Follow up with Dr Emory Child or Dr. Radha Daniel In 1 week(s) in the wound care center. Wound Care Center Information: Should you experience any significant changes in your wound(s) or have questions about your wound care, please contact the [employer] at [phone] Monday  - Thursday 8:00 am - 4:00 pm and Friday 8:00 am - 1:00pm. If you need help with your wound outside these hours and cannot wait until we are again available, contact your PCP or go to the hospital emergency room. PLEASE NOTE: IF YOU ARE UNABLE TO OBTAIN WOUND SUPPLIES, CONTINUE TO USE THE SUPPLIES YOU HAVE AVAILABLE UNTIL YOU ARE ABLE TO REACH US. IT IS MOST IMPORTANT TO KEEP THE WOUND COVERED AT ALL TIMES. Patient Experience     Thank you for trusting us with your care. You may receive a survey from a company called [employer] asking for your feedback. We would appreciate it if you took a few minutes to share your experience.   Your input is very valuable to us

## 2022-12-02 NOTE — PROGRESS NOTES
Multilayer Compression Wrap   Below the Knee    NAME:  Kwaku Ni  YOB: 1950  MEDICAL RECORD NUMBER:  [id number]  DATE:  12/2/2022    Multilayer compression wrap: Applied multilayered dressing below the knee to left lower leg. Instructed patient/caregiver not to remove dressing and to keep it clean and dry. Instructed patient/caregiver on complications to report to provider, such as pain, numbness in toes, heavy drainage, and slippage of dressing. Instructed patient on purpose of compression dressing and on activity and exercise recommendations.      Applied  2 layer wrap from toes to knee overlapping each time    Electronically signed by Casey Elizabeth RN on 12/2/2022 at 10:51 AM

## 2022-12-06 ENCOUNTER — HOSPITAL ENCOUNTER (OUTPATIENT)
Dept: WOUND CARE | Age: 72
Discharge: HOME | End: 2022-12-06
Payer: COMMERCIAL

## 2022-12-06 VITALS — RESPIRATION RATE: 16 BRPM | SYSTOLIC BLOOD PRESSURE: 166 MMHG | HEART RATE: 66 BPM | DIASTOLIC BLOOD PRESSURE: 69 MMHG

## 2022-12-06 DIAGNOSIS — E11.622 DIABETES MELLITUS WITH SKIN ULCER (HCC): ICD-10-CM

## 2022-12-06 DIAGNOSIS — L97.223 NON-PRESSURE CHRONIC ULCER OF LEFT CALF WITH NECROSIS OF MUSCLE (HCC): Primary | ICD-10-CM

## 2022-12-06 DIAGNOSIS — I73.89 OTHER SPECIFIED PERIPHERAL VASCULAR DISEASES (HCC): ICD-10-CM

## 2022-12-06 DIAGNOSIS — E11.51 DIABETES TYPE 2 WITH ATHEROSCLEROSIS OF ARTERIES OF EXTREMITIES (HCC): ICD-10-CM

## 2022-12-06 DIAGNOSIS — L98.499 DIABETES MELLITUS WITH SKIN ULCER (HCC): ICD-10-CM

## 2022-12-06 DIAGNOSIS — F17.200 SMOKING ADDICTION: ICD-10-CM

## 2022-12-06 DIAGNOSIS — I70.209 DIABETES TYPE 2 WITH ATHEROSCLEROSIS OF ARTERIES OF EXTREMITIES (HCC): ICD-10-CM

## 2022-12-06 PROCEDURE — 29581 APPL MULTLAYER CMPRN SYS LEG: CPT

## 2022-12-06 PROCEDURE — 11042 DBRDMT SUBQ TIS 1ST 20SQCM/<: CPT

## 2022-12-06 RX ORDER — BACITRACIN ZINC AND POLYMYXIN B SULFATE 500; 1000 [USP'U]/G; [USP'U]/G
OINTMENT TOPICAL ONCE
OUTPATIENT
Start: 2022-12-06 | End: 2022-12-06

## 2022-12-06 RX ORDER — BETAMETHASONE DIPROPIONATE 0.05 %
OINTMENT (GRAM) TOPICAL ONCE
OUTPATIENT
Start: 2022-12-06 | End: 2022-12-06

## 2022-12-06 RX ORDER — LIDOCAINE HYDROCHLORIDE 20 MG/ML
JELLY TOPICAL ONCE
OUTPATIENT
Start: 2022-12-06 | End: 2022-12-06

## 2022-12-06 RX ORDER — GENTAMICIN SULFATE 1 MG/G
OINTMENT TOPICAL ONCE
OUTPATIENT
Start: 2022-12-06 | End: 2022-12-06

## 2022-12-06 RX ORDER — BACITRACIN, NEOMYCIN, POLYMYXIN B 400; 3.5; 5 [USP'U]/G; MG/G; [USP'U]/G
OINTMENT TOPICAL ONCE
OUTPATIENT
Start: 2022-12-06 | End: 2022-12-06

## 2022-12-06 RX ORDER — LIDOCAINE HYDROCHLORIDE 40 MG/ML
SOLUTION TOPICAL ONCE
OUTPATIENT
Start: 2022-12-06 | End: 2022-12-06

## 2022-12-06 RX ORDER — LIDOCAINE 50 MG/G
OINTMENT TOPICAL ONCE
OUTPATIENT
Start: 2022-12-06 | End: 2022-12-06

## 2022-12-06 RX ORDER — LIDOCAINE 40 MG/G
CREAM TOPICAL ONCE
OUTPATIENT
Start: 2022-12-06 | End: 2022-12-06

## 2022-12-06 RX ORDER — GINSENG 100 MG
CAPSULE ORAL ONCE
OUTPATIENT
Start: 2022-12-06 | End: 2022-12-06

## 2022-12-06 RX ORDER — CLOBETASOL PROPIONATE 0.5 MG/G
OINTMENT TOPICAL ONCE
OUTPATIENT
Start: 2022-12-06 | End: 2022-12-06

## 2022-12-06 NOTE — PLAN OF CARE
Discharge instructions given. Patient verbalized understanding. Continue compression wrap. Return to [facility] in 1 week(s).

## 2022-12-06 NOTE — PROGRESS NOTES
[address] [facility]   Medical Staff Progress Note     Karley Beaver  MEDICAL RECORD NUMBER:  [id number]  AGE: 67 y.o. GENDER: female  : 1950  EPISODE DATE:  2022    Chief complaint and reason for visit:     Chief Complaint   Patient presents with    Wound Check     Follow up leg wound      Patient presenting for follow up evaluation of wound(s) per chief complaint. Subjective and ROS: Symptoms, wound related issues, or other pertinent wound history since last visit: no new wound care issues. Tolerating current treatment. No systemic complaints above baseline. Getting compression for left leg ulcer. Normally sees a podiatrist. [place] to me. History of Wound Context: Per original history and physical on this patient. Changes in history since last evaluation: none. Wound present since 2022. Had skin cancer removed. With remaining wound to left leg. 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 Severity of muscle necrosis. Debridement done to remove nonviable fibrous tissue. Continue with gentamicin and Triad topical dressing with 2 layer compression. Other associated diagnoses or problems addressed:  Diabetes mellitus type 2. Education and counseling provided. Tobacco dependence. Patient continues to smoke. Education counseling provided extensively here. Nutritional support. Education counseling provided. Protein emphasis with meals. Pertinent imaging reviewed including independent interpretation include:  None    Pertinent labs reviewed.    Medical records and review of external note (s) from other providers done as well. New lab or imaging orders placed:  None     Prescription drug management: N/A     Discussion of management or test interpretation with another provider, other qualified health care professional, and other external source. Comorbid conditions affecting wound healing: As per [facility] which was reviewed. Risk of complications and/or mortality of patient management: This patient has a moderate risk of morbidity and mortality from additional diagnostic testing or treatment. This is due to the above conditions affecting wound healing as well as patient and procedure risk factors. Education and discussion held with patient regarding these disease processes pertinent to wound(s). Other pertinent decisions include: minor surgery or procedures as below. The patient's diagnosis or treatment is not significantly limited by social determinants of health as noted by: N/A . Wound 10/28/22 Leg Left; Lower;Medial #1 (Active)   Wound Image   10/28/22 0948   Wound Etiology Non-Healing Surgical 12/06/22 1424   Wound Cleansed Cleansed with saline 12/06/22 1424   Dressing/Treatment Antibacterial ointment;Dry dressing;Triad hydro/zinc oxide-based hydrophilic paste 96/16/53 7912   Wound Length (cm) 2.2 cm 12/06/22 1424   Wound Width (cm) 1 cm 12/06/22 1424   Wound Depth (cm) 0.1 cm 12/06/22 1424   Wound Surface Area (cm^2) 2.2 cm^2 12/06/22 1424   Change in Wound Size % (l*w) 62.39 12/06/22 1424   Wound Volume (cm^3) 0.22 cm^3 12/06/22 1424   Wound Healing % 87 12/06/22 1424   Post-Procedure Length (cm) 2.2 cm 12/06/22 1447   Post-Procedure Width (cm) 1 cm 12/06/22 1447   Post-Procedure Depth (cm) 0.1 cm 12/06/22 1447   Post-Procedure Surface Area (cm^2) 2.2 cm^2 12/06/22 1447   Post-Procedure Volume (cm^3) 0.22 cm^3 12/06/22 1447   Wound Assessment Bleeding 12/06/22 1447   Drainage Amount Moderate 12/06/22 1424   Drainage Description Thick;Purulent;Yellow 12/06/22 1424   Odor None 12/06/22 1424   Aga-wound Assessment Intact; Hemosiderin staining (brown yellow) 12/06/22 1424   Margins Attached edges; Defined edges 12/06/22 1424   Wound Thickness Description not for Pressure Injury Full thickness 10/28/22 0948   Number of days: 39          Procedures done during this encounter:   Debridement: Excisional Debridement  Indications:  Based on my examination of this patient's wound(s)/ulcer(s) today, debridement is required to promote healing and evaluate the wound base. Risks and benefits discussed with patient who has agreed to proceed. Performed by: Leanne Sutton MD  Consent obtained:  Yes  Time out taken:  Yes  Pain Control: Anesthetic  Anesthetic: 4% Lidocaine Cream   Using curette the wound(s)/ulcer(s) was/were debrided down through and including the removal of subcutaneous tissue. Devitalized Tissue Debrided:  fibrin, biofilm, slough, and exudate  Pre Debridement Measurements:  Are located in the [place]  Documentation Flow Sheet  Wound/Ulcer #: 1  Post Debridement Measurements:  Wound/Ulcer Descriptions are Pre Debridement except measurements: Total Surface Area Debrided:  2.2 sq cm   Diabetic/Pressure/Non Pressure Ulcers only:  Ulcer: Non-Pressure ulcer, fat layer exposed   Estimated Blood Loss:  Minimal  Hemostasis Achieved:  by pressure  Procedural Pain:  0  / 10   Post Procedural Pain:  0 / 10   Response to treatment:  Well tolerated by patient. TIME: E/M Time spent with patient and/or patient care issues: [x] 15-20 min  [] 21-30 min  [] 31-44 min  [] 45 min or more.    This is above the usual time needed to address patient's chief complaint today: [x] Yes  [] No  This time includes physician non-face-to-face service time visit on the date of service such as  Preparing to see the patient (eg, review of tests)  Obtaining and/or reviewing separately obtained history  Performing a medically necessary appropriate examination and/or evaluation  Counseling and educating the patient/family/caregiver  Ordering medications, tests, or procedures  Referring and communicating with other health care professionals as needed  Documenting clinical information in the electronic or other health record  Independently interpreting results (not reported separately) and communicating results to the patient/family/caregiver  Care coordination (not reported separately)    Objective:    BP (!) 166/69   Pulse 66   Resp 16   Wt Readings from Last 3 Encounters:   11/14/22 179 lb (81.2 kg)       PHYSICAL EXAM  General: Alert and in no acute distress. Normal appearing  Skin: Warm and dry, no rash  Head: Normocephalic and atraumatic  Eyes: Extraocular eye movements intact, conjunctivae normal, and sclera anicteric  ENT: Hearing grossly normal bilaterally. Normal appearance  Respiratory: no chest wall tenderness. no respiratory distress  GI: Abdomen non-tender and benign  Musculoskeletal: Baseline range of motion in joints. Nontender calves. No cyanosis. Edema 1+. Neurologic: Speech normal. At baseline without new focal deficits. Mental status normal or at baseline    PAST MEDICAL HISTORY        Diagnosis Date    Cancer [facility])     Skin cancer Left lower leg    COPD (chronic obstructive pulmonary disease) [address]     Diabetes mellitus ([facility]     Hyperlipidemia        PAST SURGICAL HISTORY    Past Surgical History:   Procedure Laterality Date    ORTHOPEDIC SURGERY Left        FAMILY HISTORY    History reviewed. No pertinent family history.     SOCIAL HISTORY    Social History     Tobacco Use    Smoking status: Every Day     Packs/day: 1.50     Years: 40.00     Pack years: 60.00     Types: Cigarettes    Smokeless tobacco: Never   Vaping Use    Vaping Use: Never used   Substance Use Topics    Alcohol use: Not Currently    Drug use: Never       ALLERGIES    No Known Allergies    MEDICATIONS    Current Outpatient Medications on File Prior to Encounter   Medication Sig Dispense Refill    collagenase (SANTYL) 250 UNIT/GM ointment Quantity sufficient for 30 days. Apply topically daily. Wounds (cm): Left lower leg- 3.7 x 2 60 g 1    Cyanocobalamin (VITAMIN B 12) 500 MCG TABS Take 5,000 mcg by mouth daily      vitamin C (ASCORBIC ACID) 500 MG tablet Take 500 mg by mouth daily      Calcium Carb-Cholecalciferol (CALCIUM 1000 + D) 1000-800 MG-UNIT TABS Take 2 tablets by mouth daily      Multiple Vitamin (MULTIVITAMIN PO) Take 1 tablet by mouth daily      albuterol sulfate HFA (PROVENTIL;VENTOLIN;PROAIR) 108 (90 Base) MCG/ACT inhaler Inhale 1 puff into the lungs every 6 hours as needed (Patient not taking: Reported on 11/4/2022)      acetaminophen-codeine (TYLENOL #3) 300-30 MG per tablet Take 1 tablet by mouth as needed. aspirin 81 MG EC tablet Take 81 mg by mouth daily      dulaglutide (TRULICITY) 1.5 ZJ/7.6ZD SC injection Inject 0.5 mLs into the skin once a week      lisinopril (PRINIVIL;ZESTRIL) 2.5 MG tablet Take 2.5 mg by mouth daily      pravastatin (PRAVACHOL) 20 MG tablet Take 20 mg by mouth daily      sitaGLIPtan-metFORMIN (JANUMET)  MG per tablet Take 1 tablet by mouth daily      gentamicin (GARAMYCIN) 0.1 % cream Apply topically daily. 30 g 1     No current facility-administered medications on file prior to encounter. Written patient dismissal instructions given to patient and signed by patient or POA.          Electronically signed by Bella Rodriguez MD on 12/6/2022 at 2:53 PM

## 2022-12-06 NOTE — PROGRESS NOTES
Multilayer Compression Wrap   Below the Knee    NAME:  Karley Beaver  YOB: 1950  MEDICAL RECORD NUMBER:  [id number]  DATE:  12/6/2022    Multilayer compression wrap: Applied primary and secondary dressing as ordered. Applied multilayered dressing below the knee to left lower leg. Instructed patient/caregiver not to remove dressing and to keep it clean and dry. Instructed patient/caregiver on complications to report to provider, such as pain, numbness in toes, heavy drainage, and slippage of dressing. Instructed patient on purpose of compression dressing and on activity and exercise recommendations.      Applied  2 layer wrap from toes to knee overlapping each time    Electronically signed by Monika Turner RN on 12/6/2022 at 3:05 PM

## 2022-12-12 NOTE — DISCHARGE INSTRUCTIONS
[address], [address]  Telephone: [phone]     Discharge Instructions     Important reminders:     **If you have any signs and symptoms of illness (Cough, fever, congestion, nausea, vomiting, diarrhea, etc.) please call the wound care center prior to your appointment. 1. Increase Protein intake for optimal wound healing  2. No added salt to reduce any swelling  3. If diabetic, maintain good glucose control  4. If you smoke, smoking prohibits wound healing, we ask that you refrain from smoking. 5. When taking antibiotics take the entire prescription as ordered. Do not stop taking until medication is all gone unless otherwise instructed. 6. Exercise as tolerated. 7. Keep weight off wounds and reposition every 2 hours if applicable. 8. If wound(s) is on your lower extremity, elevate legs to the level of the heart or above for 30 minutes 4-5 times a day and/or when sitting. Avoid standing for long periods of time. 9. Do not get wounds wet in bath or shower unless otherwise instructed by your physician. If your wound is on your foot or leg, you may purchase a cast bag. Please ask at the pharmacy. If Vascular testing is ordered, please call [address] [place] [address] [phone]) to schedule. Vascular tests ordered by Wound Care Physicians may take up to 2 hours to complete. Please keep that in mind when scheduling. If Vascular testing is scheduled, please bring supplies to replace your dressing after testing is done. The vascular department does not stock supplies. Wound:  Left leg     With each dressing change, rinse wounds with 0.9% Saline. (May use wound wash or soft contact solution. Both can be purchased at a local drug store). If unable to obtain saline, may use a gentle soap and water. Dressing care: NO Smoking. Do not get wet in the shower. Lotrisone to leg under wrap.  Mix Gentamicin cream & Triad, 4 x 4's, Coflex Calamine- these will be changed at your next visit unless they slide down or cause pain. If they slide, gets wet, or cause pain please call the wound care center, you may need to come in to be re-wrapped. If you are unable to get to your follow up appointment you will need to remove your wraps at home & place some kind of compression. Keep wound covered at all times. May remove wrap the day you come for appt to wash leg, the cover wound     Compression Wraps  Location: Left lower leg below knee  Type: Coflex Calamine     Your doctor has ordered compression therapy for your wound. Compression bandages reduce the swelling, or edema, in your legs and prevent it from returning. The wound care staff will apply your compression wrap. It must be removed and re-applied at least weekly. As the swelling decreases, the boot no longer provides adequate compression and you need a new one. Once applied, you need to know how to take care of your compression wrap. The boot must stay dry. Do not get it wet in the shower or tub. You may do a partial bath, or you can cover the boot with a large plastic bag, secured at the top, so that no water can get in. Avoid standing in one place for long periods of time. If you must  one place, shift your weight and change positions often. If you have CHF, consult your doctor before following the next two recommendations for leg elevation. When sitting, elevate your legs on pillows, or put blocks under the foot of your bed. Your legs should be higher than your heart. If your boot becomes painful, or you notice an increase in swelling in your toes, numbness or tingling, or purple color to your toes, remove the wrap and call the [address] [place] [address] Road. If it is after hours, call your doctor for instructions or go to the nearest emergency room.       Dr Aguilera Client, Vascular Surgeon  - Edilberto Giraldo for studies  [address] [place] [address] 37, [address]  Phone# [phone]  Fax# [phone]        Home Care Agency/Facility:      Your wound-care supplies will be provided by: [facility] -   phone #: [phone]     Please note, depending on your insurance coverage, you may have out-of-pocket expenses for these supplies. Someone from the company should call you to confirm your order and discuss those potential costs before they ship your products -- please anticipate that call. If your out-of-pocket cost could be substantial, Many companies have financial hardship programs for patients who qualify, so please ask about that if you might need a hand. If you have any questions about your supplies or your potential out-of-pocket costs, or if you need to place an order for a refill of supplies (typically monthly), please call the company directly. Your  is Delfin Ac     Follow up with Dr Nahun Giang In 1 week(s) in the wound care center. Wound Care Center Information: Should you experience any significant changes in your wound(s) or have questions about your wound care, please contact the "[employer]" at [phone] Monday  - Thursday 8:00 am - 4:00 pm and Friday 8:00 am - 1:00pm. If you need help with your wound outside these hours and cannot wait until we are again available, contact your PCP or go to the hospital emergency room. PLEASE NOTE: IF YOU ARE UNABLE TO OBTAIN WOUND SUPPLIES, CONTINUE TO USE THE SUPPLIES YOU HAVE AVAILABLE UNTIL YOU ARE ABLE TO REACH US. IT IS MOST IMPORTANT TO KEEP THE WOUND COVERED AT ALL TIMES. Patient Experience     Thank you for trusting us with your care. You may receive a survey from a company called [employer] asking for your feedback. We would appreciate it if you took a few minutes to share your experience.   Your input is very valuable to us

## 2022-12-14 ENCOUNTER — HOSPITAL ENCOUNTER (OUTPATIENT)
Dept: WOUND CARE | Age: 72
Discharge: HOME | End: 2022-12-14
Payer: COMMERCIAL

## 2022-12-14 VITALS
SYSTOLIC BLOOD PRESSURE: 146 MMHG | TEMPERATURE: 96.9 F | DIASTOLIC BLOOD PRESSURE: 68 MMHG | HEART RATE: 65 BPM | RESPIRATION RATE: 16 BRPM

## 2022-12-14 DIAGNOSIS — L97.223 NON-PRESSURE CHRONIC ULCER OF LEFT CALF WITH NECROSIS OF MUSCLE (HCC): Primary | ICD-10-CM

## 2022-12-14 PROCEDURE — 29581 APPL MULTLAYER CMPRN SYS LEG: CPT

## 2022-12-14 PROCEDURE — 11042 DBRDMT SUBQ TIS 1ST 20SQCM/<: CPT

## 2022-12-14 PROCEDURE — 11043 DBRDMT MUSC&/FSCA 1ST 20/<: CPT

## 2022-12-14 RX ORDER — BACITRACIN ZINC AND POLYMYXIN B SULFATE 500; 1000 [USP'U]/G; [USP'U]/G
OINTMENT TOPICAL ONCE
OUTPATIENT
Start: 2022-12-14 | End: 2022-12-14

## 2022-12-14 RX ORDER — CLOBETASOL PROPIONATE 0.5 MG/G
OINTMENT TOPICAL ONCE
OUTPATIENT
Start: 2022-12-14 | End: 2022-12-14

## 2022-12-14 RX ORDER — LIDOCAINE HYDROCHLORIDE 20 MG/ML
JELLY TOPICAL ONCE
OUTPATIENT
Start: 2022-12-14 | End: 2022-12-14

## 2022-12-14 RX ORDER — GINSENG 100 MG
CAPSULE ORAL ONCE
OUTPATIENT
Start: 2022-12-14 | End: 2022-12-14

## 2022-12-14 RX ORDER — LIDOCAINE 50 MG/G
OINTMENT TOPICAL ONCE
OUTPATIENT
Start: 2022-12-14 | End: 2022-12-14

## 2022-12-14 RX ORDER — LIDOCAINE 40 MG/G
CREAM TOPICAL ONCE
Status: DISCONTINUED | OUTPATIENT
Start: 2022-12-14 | End: 2022-12-15 | Stop reason: HOSPADM

## 2022-12-14 RX ORDER — BETAMETHASONE DIPROPIONATE 0.05 %
OINTMENT (GRAM) TOPICAL ONCE
OUTPATIENT
Start: 2022-12-14 | End: 2022-12-14

## 2022-12-14 RX ORDER — GENTAMICIN SULFATE 1 MG/G
OINTMENT TOPICAL ONCE
OUTPATIENT
Start: 2022-12-14 | End: 2022-12-14

## 2022-12-14 RX ORDER — LIDOCAINE 40 MG/G
CREAM TOPICAL ONCE
OUTPATIENT
Start: 2022-12-14 | End: 2022-12-14

## 2022-12-14 RX ORDER — LIDOCAINE HYDROCHLORIDE 40 MG/ML
SOLUTION TOPICAL ONCE
OUTPATIENT
Start: 2022-12-14 | End: 2022-12-14

## 2022-12-14 RX ORDER — BACITRACIN, NEOMYCIN, POLYMYXIN B 400; 3.5; 5 [USP'U]/G; MG/G; [USP'U]/G
OINTMENT TOPICAL ONCE
OUTPATIENT
Start: 2022-12-14 | End: 2022-12-14

## 2022-12-14 NOTE — PROGRESS NOTES
[facility]  Progress Note and Procedure Note      Andrea Pantoja  AGE: 67 y.o. GENDER: female  : 1950  TODAY'S DATE:  2022    Subjective:     Chief Complaint   Patient presents with    Wound Check     Left lower leg         HISTORY of PRESENT ILLNESS HPI     Andrea Pantoja is a 67 y.o. female who presents today for wound evaluation. History of Wound: Patient admits to having a surgical procedure to remove skin cancer at the end of 2022. She has not been able to heal the wound since the surgery. She admits to having a previous injury to the leg and having discoloration in the area where the skin cancer was for many years. S  She admits to being diabetic and smoking 1-1/2 packs of cigarettes daily. She has left the dressing intact without incident. She admits to some itching. She denies current nausea, vomiting, fever, chills, shortness of breath or chest pain. Wound Pain:  intermittent  Severity:  3 / 10   Wound Type:  venous, arterial, diabetic, and non-healing surgical  Modifying Factors:  edema, venous stasis, diabetes, smoking, arterial insufficiency, and decreased tissue oxygenation  Associated Signs/Symptoms:  edema, drainage, and pain        PAST MEDICAL HISTORY        Diagnosis Date    Cancer [facility])     Skin cancer Left lower leg    COPD (chronic obstructive pulmonary disease) ([facility]     Diabetes mellitus ([facility]     Hyperlipidemia        PAST SURGICAL HISTORY    Past Surgical History:   Procedure Laterality Date    ORTHOPEDIC SURGERY Left        FAMILY HISTORY    History reviewed. No pertinent family history.     SOCIAL HISTORY    Social History     Tobacco Use    Smoking status: Every Day     Packs/day: 1.50     Years: 40.00     Pack years: 60.00     Types: Cigarettes    Smokeless tobacco: Never   Vaping Use    Vaping Use: Never used   Substance Use Topics    Alcohol use: Not Currently    Drug use: Never       ALLERGIES    No Known Allergies    MEDICATIONS    Current Outpatient Medications on File Prior to Encounter   Medication Sig Dispense Refill    collagenase (SANTYL) 250 UNIT/GM ointment Quantity sufficient for 30 days. Apply topically daily. Wounds (cm): Left lower leg- 3.7 x 2 60 g 1    Cyanocobalamin (VITAMIN B 12) 500 MCG TABS Take 5,000 mcg by mouth daily      vitamin C (ASCORBIC ACID) 500 MG tablet Take 500 mg by mouth daily      Calcium Carb-Cholecalciferol (CALCIUM 1000 + D) 1000-800 MG-UNIT TABS Take 2 tablets by mouth daily      Multiple Vitamin (MULTIVITAMIN PO) Take 1 tablet by mouth daily      albuterol sulfate HFA (PROVENTIL;VENTOLIN;PROAIR) 108 (90 Base) MCG/ACT inhaler Inhale 1 puff into the lungs every 6 hours as needed (Patient not taking: Reported on 11/4/2022)      acetaminophen-codeine (TYLENOL #3) 300-30 MG per tablet Take 1 tablet by mouth as needed. aspirin 81 MG EC tablet Take 81 mg by mouth daily      dulaglutide (TRULICITY) 1.5 PJ/4.4LD SC injection Inject 0.5 mLs into the skin once a week      lisinopril (PRINIVIL;ZESTRIL) 2.5 MG tablet Take 2.5 mg by mouth daily      pravastatin (PRAVACHOL) 20 MG tablet Take 20 mg by mouth daily      sitaGLIPtan-metFORMIN (JANUMET)  MG per tablet Take 1 tablet by mouth daily      gentamicin (GARAMYCIN) 0.1 % cream Apply topically daily. 30 g 1     No current facility-administered medications on file prior to encounter. REVIEW OF SYSTEMS    Pertinent items are noted in HPI. Objective:      BP (!) 146/68   Pulse 65   Temp 96.9 °F (36.1 °C) (Infrared)   Resp 16     PHYSICAL EXAM    Vascular: Vascular status Impaired  palpable pedal pulses, right DP1/4 and PT1/4, left DP1/4 and PT1/4. CFT 3 seconds digits 1 to 5 bilateral.  Hair growthAbsent  both lower extremities and feet. Skin temperature is warm to cool from pretibial area to distal digits bilateral.  Exam is negative for rubor, pallor, cyanosis or signs of acute vascular compromise bilaterally. Exam is negative for edema bilateral lower extremity. Varicosities Absent  bilateral lower extremity. Neuro: Neurologic status diminished bilateral with epicritic Present , proprioceptive Present, vibratory sensationPresent and protopathicPresent. DTRs Present bilateral Achilles. There were no reproducible neuritic symptoms on exam bilateral feet/ankles. Derm: Ulceration to left leg. Ecchymosis Absent  bilateral feet/foot. Musculoskeletal: No pain with debridement of wound 5/5 muscle strength in/eversion and dorsi/plantarflexion bilateral feet. No gross instability noted. 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 Devitalized Tissue Debrided:  fibrin, biofilm, slough, necrotic/eschar, and exudate    Pre Debridement Measurements:  Are located in the Wound Documentation Flow Sheet    Wound #: 1   Wound Care Documentation:  Wound 10/28/22 Leg Left; Lower;Medial #1 (Active)   Wound Image   10/28/22 0948   Wound Etiology Non-Healing Surgical 12/14/22 1416   Wound Cleansed Cleansed with saline 12/14/22 1416   Dressing/Treatment Antibacterial ointment;Dry dressing;Triad hydro/zinc oxide-based hydrophilic paste 24/39/41 0012   Offloading for Diabetic Foot Ulcers Offloading not ordered 12/14/22 1416   Wound Length (cm) 2 cm 12/14/22 1416   Wound Width (cm) 0.7 cm 12/14/22 1416   Wound Depth (cm) 0.1 cm 12/14/22 1416   Wound Surface Area (cm^2) 1.4 cm^2 12/14/22 1416   Change in Wound Size % (l*w) 76.07 12/14/22 1416   Wound Volume (cm^3) 0.14 cm^3 12/14/22 1416   Wound Healing % 92 12/14/22 1416   Post-Procedure Length (cm) 2 cm 12/14/22 1455   Post-Procedure Width (cm) 0.7 cm 12/14/22 1455   Post-Procedure Depth (cm) 0.1 cm 12/14/22 1455   Post-Procedure Surface Area (cm^2) 1.4 cm^2 12/14/22 1455   Post-Procedure Volume (cm^3) 0.14 cm^3 12/14/22 1455   Wound Assessment Bleeding 12/14/22 1455   Drainage Amount Moderate 12/14/22 1416   Drainage Description Serosanguinous; Yellow;Purulent 12/14/22 1416   Odor None 12/14/22 1416   Aga-wound Assessment Intact 12/14/22 1416   Margins Defined edges 12/14/22 1416   Wound Thickness Description not for Pressure Injury Full thickness 10/28/22 0948   Number of days: 47         Total Surface Area Debrided:  1.4 sq cm     Percentage of wound debrided 100%    Bleeding:  Minimal    Hemostasis Achieved:  by pressure    Procedural Pain:  0  / 10     Post Procedural Pain:  0 / 10     Response to treatment:  Well tolerated by patient. Plan:   Patient examined and evaluated  Wound sharply debrided without incident  Discussed appropriate diabetic diet  Again encouraged smoking decrease or cessation  Multi layer compression wrap with triad and gentamicin cream.  The nature of the patient's condition was explained in depth.  The patient was informed that their compliance to the treatment plan is paramount to successful healing and prevention of further ulceration and/or infection     Discharge Treatment Daily dressing changes    Written Patient Discharge Instructions Given            Electronically signed by Shirley Stack DPM on 12/14/2022 at 3:26 PM

## 2022-12-14 NOTE — PLAN OF CARE
Discharge instructions given. Patient verbalized understanding. Return to [address] [place] [address] in 1 week(s).

## 2022-12-20 NOTE — DISCHARGE INSTRUCTIONS
[facility], [address] [place] [address] Road  Telephone: [phone]     Discharge Instructions     Important reminders:     **If you have any signs and symptoms of illness (Cough, fever, congestion, nausea, vomiting, diarrhea, etc.) please call the wound care center prior to your appointment. 1. Increase Protein intake for optimal wound healing  2. No added salt to reduce any swelling  3. If diabetic, maintain good glucose control  4. If you smoke, smoking prohibits wound healing, we ask that you refrain from smoking. 5. When taking antibiotics take the entire prescription as ordered. Do not stop taking until medication is all gone unless otherwise instructed. 6. Exercise as tolerated. 7. Keep weight off wounds and reposition every 2 hours if applicable. 8. If wound(s) is on your lower extremity, elevate legs to the level of the heart or above for 30 minutes 4-5 times a day and/or when sitting. Avoid standing for long periods of time. 9. Do not get wounds wet in bath or shower unless otherwise instructed by your physician. If your wound is on your foot or leg, you may purchase a cast bag. Please ask at the pharmacy. If Vascular testing is ordered, please call [address] [place] [address] [phone]) to schedule. Vascular tests ordered by Wound Care Physicians may take up to 2 hours to complete. Please keep that in mind when scheduling. If Vascular testing is scheduled, please bring supplies to replace your dressing after testing is done. The vascular department does not stock supplies. Wound:  Left leg     With each dressing change, rinse wounds with 0.9% Saline. (May use wound wash or soft contact solution. Both can be purchased at a local drug store). If unable to obtain saline, may use a gentle soap and water. Dressing care: NO Smoking. Do not get wet in the shower. Lotrisone to leg under wrap.  Mix Gentamicin cream & Triad, Collagen powder, 4 x 4's, Coflex Calamine- these will be changed at your next visit unless they slide down or cause pain. If they slide, gets wet, or cause pain please call the wound care center, you may need to come in to be re-wrapped. If you are unable to get to your follow up appointment you will need to remove your wraps at home & place some kind of compression. Keep wound covered at all times. May remove wrap the day you come for appt to wash leg, the cover wound     Compression Wraps  Location: Left lower leg below knee  Type: Coflex Calamine     Your doctor has ordered compression therapy for your wound. Compression bandages reduce the swelling, or edema, in your legs and prevent it from returning. The wound care staff will apply your compression wrap. It must be removed and re-applied at least weekly. As the swelling decreases, the boot no longer provides adequate compression and you need a new one. Once applied, you need to know how to take care of your compression wrap. The boot must stay dry. Do not get it wet in the shower or tub. You may do a partial bath, or you can cover the boot with a large plastic bag, secured at the top, so that no water can get in. Avoid standing in one place for long periods of time. If you must  one place, shift your weight and change positions often. If you have CHF, consult your doctor before following the next two recommendations for leg elevation. When sitting, elevate your legs on pillows, or put blocks under the foot of your bed. Your legs should be higher than your heart. If your boot becomes painful, or you notice an increase in swelling in your toes, numbness or tingling, or purple color to your toes, remove the wrap and call the [address] [place] [address] Road. If it is after hours, call your doctor for instructions or go to the nearest emergency room.       Dr Raúl Cloud, Vascular Surgeon  - [facility] Decree for studies  [address], [address]  Phone# [phone]  Fax# [phone] Home Care Agency/Facility:      Your wound-care supplies will be provided by: [employer] -   phone #: [phone]     Please note, depending on your insurance coverage, you may have out-of-pocket expenses for these supplies. Someone from the company should call you to confirm your order and discuss those potential costs before they ship your products -- please anticipate that call. If your out-of-pocket cost could be substantial, Many companies have financial hardship programs for patients who qualify, so please ask about that if you might need a hand. If you have any questions about your supplies or your potential out-of-pocket costs, or if you need to place an order for a refill of supplies (typically monthly), please call the company directly. Your  is Kristie Jiménez     Follow up with Dr Carlo Mackenzie In 1 week(s) in the wound care center. Wound Care Center Information: Should you experience any significant changes in your wound(s) or have questions about your wound care, please contact the [employer] at [phone] Monday  - Thursday 8:00 am - 4:00 pm and Friday 8:00 am - 1:00pm. If you need help with your wound outside these hours and cannot wait until we are again available, contact your PCP or go to the hospital emergency room. PLEASE NOTE: IF YOU ARE UNABLE TO OBTAIN WOUND SUPPLIES, CONTINUE TO USE THE SUPPLIES YOU HAVE AVAILABLE UNTIL YOU ARE ABLE TO REACH US. IT IS MOST IMPORTANT TO KEEP THE WOUND COVERED AT ALL TIMES. Patient Experience     Thank you for trusting us with your care. You may receive a survey from a company called [employer] asking for your feedback. We would appreciate it if you took a few minutes to share your experience.   Your input is very valuable to us

## 2022-12-21 ENCOUNTER — HOSPITAL ENCOUNTER (OUTPATIENT)
Dept: WOUND CARE | Age: 72
Discharge: HOME | End: 2022-12-21
Payer: COMMERCIAL

## 2022-12-21 VITALS
TEMPERATURE: 96 F | SYSTOLIC BLOOD PRESSURE: 137 MMHG | HEART RATE: 56 BPM | DIASTOLIC BLOOD PRESSURE: 67 MMHG | RESPIRATION RATE: 16 BRPM

## 2022-12-21 DIAGNOSIS — L97.223 NON-PRESSURE CHRONIC ULCER OF LEFT CALF WITH NECROSIS OF MUSCLE (HCC): Primary | ICD-10-CM

## 2022-12-21 PROCEDURE — 29581 APPL MULTLAYER CMPRN SYS LEG: CPT

## 2022-12-21 PROCEDURE — 11043 DBRDMT MUSC&/FSCA 1ST 20/<: CPT

## 2022-12-21 RX ORDER — LIDOCAINE 40 MG/G
CREAM TOPICAL ONCE
Status: DISCONTINUED | OUTPATIENT
Start: 2022-12-21 | End: 2022-12-22 | Stop reason: HOSPADM

## 2022-12-21 RX ORDER — LIDOCAINE 50 MG/G
OINTMENT TOPICAL ONCE
OUTPATIENT
Start: 2022-12-21 | End: 2022-12-21

## 2022-12-21 RX ORDER — BACITRACIN ZINC AND POLYMYXIN B SULFATE 500; 1000 [USP'U]/G; [USP'U]/G
OINTMENT TOPICAL ONCE
OUTPATIENT
Start: 2022-12-21 | End: 2022-12-21

## 2022-12-21 RX ORDER — LIDOCAINE HYDROCHLORIDE 20 MG/ML
JELLY TOPICAL ONCE
OUTPATIENT
Start: 2022-12-21 | End: 2022-12-21

## 2022-12-21 RX ORDER — GENTAMICIN SULFATE 1 MG/G
OINTMENT TOPICAL ONCE
OUTPATIENT
Start: 2022-12-21 | End: 2022-12-21

## 2022-12-21 RX ORDER — LIDOCAINE 40 MG/G
CREAM TOPICAL ONCE
OUTPATIENT
Start: 2022-12-21 | End: 2022-12-21

## 2022-12-21 RX ORDER — CLOBETASOL PROPIONATE 0.5 MG/G
OINTMENT TOPICAL ONCE
OUTPATIENT
Start: 2022-12-21 | End: 2022-12-21

## 2022-12-21 RX ORDER — BETAMETHASONE DIPROPIONATE 0.05 %
OINTMENT (GRAM) TOPICAL ONCE
OUTPATIENT
Start: 2022-12-21 | End: 2022-12-21

## 2022-12-21 RX ORDER — LIDOCAINE HYDROCHLORIDE 40 MG/ML
SOLUTION TOPICAL ONCE
OUTPATIENT
Start: 2022-12-21 | End: 2022-12-21

## 2022-12-21 RX ORDER — BACITRACIN, NEOMYCIN, POLYMYXIN B 400; 3.5; 5 [USP'U]/G; MG/G; [USP'U]/G
OINTMENT TOPICAL ONCE
OUTPATIENT
Start: 2022-12-21 | End: 2022-12-21

## 2022-12-21 RX ORDER — GINSENG 100 MG
CAPSULE ORAL ONCE
OUTPATIENT
Start: 2022-12-21 | End: 2022-12-21

## 2022-12-21 NOTE — PROGRESS NOTES
Multilayer Compression Wrap   Below the Knee    NAME:  Jesse Barone  YOB: 1950  MEDICAL RECORD NUMBER:  [id number]  DATE:  12/21/2022    Multilayer compression wrap: Applied primary and secondary dressing as ordered. Applied multilayered dressing below the knee to left lower leg. Instructed patient/caregiver not to remove dressing and to keep it clean and dry. Instructed patient/caregiver on complications to report to provider, such as pain, numbness in toes, heavy drainage, and slippage of dressing. Instructed patient on purpose of compression dressing and on activity and exercise recommendations.      Applied  2 layer wrap from toes to knee overlapping each time    Electronically signed by Cyrus Owens RN on 12/21/2022 at 4:00 PM

## 2022-12-21 NOTE — PROGRESS NOTES
[facility]  Progress Note and Procedure Note      Florin Chen  AGE: 67 y.o. GENDER: female  : 1950  TODAY'S DATE:  2022    Subjective:     Chief Complaint   Patient presents with    Wound Check     Left leg         HISTORY of PRESENT ILLNESS HPI     Florin Chen is a 67 y.o. female who presents today for wound evaluation. History of Wound: Patient admits to having a surgical procedure to remove skin cancer at the end of 2022. She has not been able to heal the wound since the surgery. She admits to having a previous injury to the leg and having discoloration in the area where the skin cancer was for many years. S  She admits to being diabetic and smoking 1-1/2 packs of cigarettes daily. She has left the dressing intact without incident. She admits to itching. She denies current nausea, vomiting, fever, chills, shortness of breath or chest pain. Wound Pain:  intermittent  Severity:  3 / 10   Wound Type:  venous, arterial, diabetic, and non-healing surgical  Modifying Factors:  edema, venous stasis, diabetes, smoking, arterial insufficiency, and decreased tissue oxygenation  Associated Signs/Symptoms:  edema, drainage, and pain        PAST MEDICAL HISTORY        Diagnosis Date    Cancer [facility])     Skin cancer Left lower leg    COPD (chronic obstructive pulmonary disease) ([facility] [address]     Diabetes mellitus ([facility] [address]     Hyperlipidemia        PAST SURGICAL HISTORY    Past Surgical History:   Procedure Laterality Date    ORTHOPEDIC SURGERY Left        FAMILY HISTORY    History reviewed. No pertinent family history.     SOCIAL HISTORY    Social History     Tobacco Use    Smoking status: Every Day     Packs/day: 1.50     Years: 40.00     Pack years: 60.00     Types: Cigarettes    Smokeless tobacco: Never   Vaping Use    Vaping Use: Never used   Substance Use Topics    Alcohol use: Not Currently    Drug use: Never       ALLERGIES    No Known Allergies    MEDICATIONS    Current Outpatient Medications on File Prior to Encounter   Medication Sig Dispense Refill    collagenase (SANTYL) 250 UNIT/GM ointment Quantity sufficient for 30 days. Apply topically daily. Wounds (cm): Left lower leg- 3.7 x 2 60 g 1    Cyanocobalamin (VITAMIN B 12) 500 MCG TABS Take 5,000 mcg by mouth daily      vitamin C (ASCORBIC ACID) 500 MG tablet Take 500 mg by mouth daily      Calcium Carb-Cholecalciferol (CALCIUM 1000 + D) 1000-800 MG-UNIT TABS Take 2 tablets by mouth daily      Multiple Vitamin (MULTIVITAMIN PO) Take 1 tablet by mouth daily      albuterol sulfate HFA (PROVENTIL;VENTOLIN;PROAIR) 108 (90 Base) MCG/ACT inhaler Inhale 1 puff into the lungs every 6 hours as needed (Patient not taking: Reported on 11/4/2022)      acetaminophen-codeine (TYLENOL #3) 300-30 MG per tablet Take 1 tablet by mouth as needed. aspirin 81 MG EC tablet Take 81 mg by mouth daily      dulaglutide (TRULICITY) 1.5 RL/8.0SL SC injection Inject 0.5 mLs into the skin once a week      lisinopril (PRINIVIL;ZESTRIL) 2.5 MG tablet Take 2.5 mg by mouth daily      pravastatin (PRAVACHOL) 20 MG tablet Take 20 mg by mouth daily      sitaGLIPtan-metFORMIN (JANUMET)  MG per tablet Take 1 tablet by mouth daily      gentamicin (GARAMYCIN) 0.1 % cream Apply topically daily. 30 g 1     No current facility-administered medications on file prior to encounter. REVIEW OF SYSTEMS    Pertinent items are noted in HPI. Objective:      /67   Pulse 56   Temp (!) 96 °F (35.6 °C) (Infrared)   Resp 16     PHYSICAL EXAM    Vascular: Vascular status Impaired  palpable pedal pulses, right DP1/4 and PT1/4, left DP1/4 and PT1/4. CFT 3 seconds digits 1 to 5 bilateral.  Hair growthAbsent  both lower extremities and feet. Skin temperature is warm to cool from pretibial area to distal digits bilateral.  Exam is negative for rubor, pallor, cyanosis or signs of acute vascular compromise bilaterally.   Exam is negative for edema bilateral lower extremity. Varicosities Absent  bilateral lower extremity. Neuro: Neurologic status diminished bilateral with epicritic Present , proprioceptive Present, vibratory sensationPresent and protopathicPresent. DTRs Present bilateral Achilles. There were no reproducible neuritic symptoms on exam bilateral feet/ankles. Derm: Ulceration to left leg. Ecchymosis Absent  bilateral feet/foot. Musculoskeletal: No pain with debridement of wound 5/5 muscle strength in/eversion and dorsi/plantarflexion bilateral feet. No gross instability noted. 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 Devitalized Tissue Debrided:  fibrin, biofilm, slough, necrotic/eschar, and exudate    Pre Debridement Measurements:  Are located in the Wound Documentation Flow Sheet    Wound #: 1   Wound Care Documentation:  Wound 10/28/22 Leg Left; Lower;Medial #1 (Active)   Wound Image   10/28/22 0948   Wound Etiology Non-Healing Surgical 12/21/22 1501   Wound Cleansed Wound cleanser 12/21/22 1501   Dressing/Treatment Antibacterial ointment;Dry dressing;Triad hydro/zinc oxide-based hydrophilic paste 64/67/77 9610   Offloading for Diabetic Foot Ulcers Offloading not ordered 12/14/22 1416   Wound Length (cm) 2 cm 12/21/22 1501   Wound Width (cm) 0.8 cm 12/21/22 1501   Wound Depth (cm) 0.2 cm 12/21/22 1501   Wound Surface Area (cm^2) 1.6 cm^2 12/21/22 1501 Change in Wound Size % (l*w) 72.65 12/21/22 1501   Wound Volume (cm^3) 0.32 cm^3 12/21/22 1501   Wound Healing % 82 12/21/22 1501   Post-Procedure Length (cm) 2 cm 12/21/22 1541   Post-Procedure Width (cm) 0.8 cm 12/21/22 1541   Post-Procedure Depth (cm) 0.2 cm 12/21/22 1541   Post-Procedure Surface Area (cm^2) 1.6 cm^2 12/21/22 1541   Post-Procedure Volume (cm^3) 0.32 cm^3 12/21/22 1541   Wound Assessment Bleeding 12/21/22 1541   Drainage Amount Moderate 12/21/22 1501   Drainage Description Serosanguinous 12/21/22 1501   Odor None 12/21/22 1501   Aga-wound Assessment Intact 12/21/22 1501   Margins Defined edges 12/21/22 1501   Wound Thickness Description not for Pressure Injury Full thickness 10/28/22 0948   Number of days: 54       Total Surface Area Debrided:  1.6 sq cm     Percentage of wound debrided 100%    Bleeding:  Minimal    Hemostasis Achieved:  by pressure    Procedural Pain:  0  / 10     Post Procedural Pain:  0 / 10     Response to treatment:  Well tolerated by patient. Plan:   Patient examined and evaluated  Wound sharply debrided without incident  Discussed appropriate diabetic diet  Encouraged smoking decrease or cessation  Multi layer compression wrap with triad and gentamicin cream.  The nature of the patient's condition was explained in depth.  The patient was informed that their compliance to the treatment plan is paramount to successful healing and prevention of further ulceration and/or infection     Discharge Treatment Daily dressing changes    Written Patient Discharge Instructions Given            Electronically signed by Morro Parikh DPM on 12/21/2022 at 4:12 PM

## 2022-12-21 NOTE — PLAN OF CARE
Discharge instructions given. Patient verbalized understanding. Return to [address] [place] [address],[address] in 1 week(s).

## 2022-12-28 ENCOUNTER — HOSPITAL ENCOUNTER (OUTPATIENT)
Dept: WOUND CARE | Age: 72
Discharge: HOME | End: 2022-12-28
Payer: COMMERCIAL

## 2022-12-28 VITALS
TEMPERATURE: 97.2 F | RESPIRATION RATE: 16 BRPM | HEART RATE: 99 BPM | DIASTOLIC BLOOD PRESSURE: 82 MMHG | SYSTOLIC BLOOD PRESSURE: 128 MMHG

## 2022-12-28 DIAGNOSIS — L97.223 NON-PRESSURE CHRONIC ULCER OF LEFT CALF WITH NECROSIS OF MUSCLE (HCC): Primary | ICD-10-CM

## 2022-12-28 PROCEDURE — 11043 DBRDMT MUSC&/FSCA 1ST 20/<: CPT

## 2022-12-28 PROCEDURE — 29581 APPL MULTLAYER CMPRN SYS LEG: CPT

## 2022-12-28 RX ORDER — LIDOCAINE HYDROCHLORIDE 20 MG/ML
JELLY TOPICAL ONCE
OUTPATIENT
Start: 2022-12-28 | End: 2022-12-28

## 2022-12-28 RX ORDER — GINSENG 100 MG
CAPSULE ORAL ONCE
OUTPATIENT
Start: 2022-12-28 | End: 2022-12-28

## 2022-12-28 RX ORDER — LIDOCAINE 50 MG/G
OINTMENT TOPICAL ONCE
OUTPATIENT
Start: 2022-12-28 | End: 2022-12-28

## 2022-12-28 RX ORDER — GENTAMICIN SULFATE 1 MG/G
OINTMENT TOPICAL ONCE
OUTPATIENT
Start: 2022-12-28 | End: 2022-12-28

## 2022-12-28 RX ORDER — BACITRACIN, NEOMYCIN, POLYMYXIN B 400; 3.5; 5 [USP'U]/G; MG/G; [USP'U]/G
OINTMENT TOPICAL ONCE
OUTPATIENT
Start: 2022-12-28 | End: 2022-12-28

## 2022-12-28 RX ORDER — LIDOCAINE 40 MG/G
CREAM TOPICAL ONCE
OUTPATIENT
Start: 2022-12-28 | End: 2022-12-28

## 2022-12-28 RX ORDER — LIDOCAINE HYDROCHLORIDE 40 MG/ML
SOLUTION TOPICAL ONCE
OUTPATIENT
Start: 2022-12-28 | End: 2022-12-28

## 2022-12-28 RX ORDER — CLOBETASOL PROPIONATE 0.5 MG/G
OINTMENT TOPICAL ONCE
OUTPATIENT
Start: 2022-12-28 | End: 2022-12-28

## 2022-12-28 RX ORDER — BACITRACIN ZINC AND POLYMYXIN B SULFATE 500; 1000 [USP'U]/G; [USP'U]/G
OINTMENT TOPICAL ONCE
OUTPATIENT
Start: 2022-12-28 | End: 2022-12-28

## 2022-12-28 RX ORDER — LIDOCAINE 40 MG/G
CREAM TOPICAL ONCE
Status: DISCONTINUED | OUTPATIENT
Start: 2022-12-28 | End: 2022-12-29 | Stop reason: HOSPADM

## 2022-12-28 RX ORDER — BETAMETHASONE DIPROPIONATE 0.05 %
OINTMENT (GRAM) TOPICAL ONCE
OUTPATIENT
Start: 2022-12-28 | End: 2022-12-28

## 2022-12-28 NOTE — DISCHARGE INSTRUCTIONS
[address], [address]  Telephone: [phone]     Discharge Instructions     Important reminders:     **If you have any signs and symptoms of illness (Cough, fever, congestion, nausea, vomiting, diarrhea, etc.) please call the wound care center prior to your appointment. 1. Increase Protein intake for optimal wound healing  2. No added salt to reduce any swelling  3. If diabetic, maintain good glucose control  4. If you smoke, smoking prohibits wound healing, we ask that you refrain from smoking. 5. When taking antibiotics take the entire prescription as ordered. Do not stop taking until medication is all gone unless otherwise instructed. 6. Exercise as tolerated. 7. Keep weight off wounds and reposition every 2 hours if applicable. 8. If wound(s) is on your lower extremity, elevate legs to the level of the heart or above for 30 minutes 4-5 times a day and/or when sitting. Avoid standing for long periods of time. 9. Do not get wounds wet in bath or shower unless otherwise instructed by your physician. If your wound is on your foot or leg, you may purchase a cast bag. Please ask at the pharmacy. If Vascular testing is ordered, please call [address] [place] [address] ([phone]) to schedule. Vascular tests ordered by Wound Care Physicians may take up to 2 hours to complete. Please keep that in mind when scheduling. If Vascular testing is scheduled, please bring supplies to replace your dressing after testing is done. The vascular department does not stock supplies. Wound:  Left leg     With each dressing change, rinse wounds with 0.9% Saline. (May use wound wash or soft contact solution. Both can be purchased at a local drug store). If unable to obtain saline, may use a gentle soap and water. Dressing care: NO Smoking. Do not get wet in the shower. Lotrisone to leg under wrap.  Mix Gentamicin cream & Triad, Collagen powder, 4 x 4's, Coflex Calamine- these will be changed at your next visit unless they slide down or cause pain. If they slide, gets wet, or cause pain please call the wound care center, you may need to come in to be re-wrapped. If you are unable to get to your follow up appointment you will need to remove your wraps at home & place some kind of compression. Keep wound covered at all times. May remove wrap the day you come for appt to wash leg, the cover wound     Compression Wraps  Location: Left lower leg below knee  Type: Coflex Calamine     Your doctor has ordered compression therapy for your wound. Compression bandages reduce the swelling, or edema, in your legs and prevent it from returning. The wound care staff will apply your compression wrap. It must be removed and re-applied at least weekly. As the swelling decreases, the boot no longer provides adequate compression and you need a new one. Once applied, you need to know how to take care of your compression wrap. The boot must stay dry. Do not get it wet in the shower or tub. You may do a partial bath, or you can cover the boot with a large plastic bag, secured at the top, so that no water can get in. Avoid standing in one place for long periods of time. If you must  one place, shift your weight and change positions often. If you have CHF, consult your doctor before following the next two recommendations for leg elevation. When sitting, elevate your legs on pillows, or put blocks under the foot of your bed. Your legs should be higher than your heart. If your boot becomes painful, or you notice an increase in swelling in your toes, numbness or tingling, or purple color to your toes, remove the wrap and call the [address] [place] [address] Road. If it is after hours, call your doctor for instructions or go to the nearest emergency room.       Dr Mac Daniels, Vascular Surgeon  - [facility] Motta for studies  [address] [place] [address], [address]  Phone# [phone]  Fax# [phone] Home Care Agency/Facility:      Your wound-care supplies will be provided by: [facility] -   phone #: [phone]     Please note, depending on your insurance coverage, you may have out-of-pocket expenses for these supplies. Someone from the company should call you to confirm your order and discuss those potential costs before they ship your products -- please anticipate that call. If your out-of-pocket cost could be substantial, Many companies have financial hardship programs for patients who qualify, so please ask about that if you might need a hand. If you have any questions about your supplies or your potential out-of-pocket costs, or if you need to place an order for a refill of supplies (typically monthly), please call the company directly. Your  is Cullen Goode     Follow up with Dr Brie Mitchell In 1 week(s) in the wound care center. Wound Care Center Information: Should you experience any significant changes in your wound(s) or have questions about your wound care, please contact the [employer] at [phone] Monday  - Thursday 8:00 am - 4:00 pm and Friday 8:00 am - 1:00pm. If you need help with your wound outside these hours and cannot wait until we are again available, contact your PCP or go to the hospital emergency room. PLEASE NOTE: IF YOU ARE UNABLE TO OBTAIN WOUND SUPPLIES, CONTINUE TO USE THE SUPPLIES YOU HAVE AVAILABLE UNTIL YOU ARE ABLE TO REACH US. IT IS MOST IMPORTANT TO KEEP THE WOUND COVERED AT ALL TIMES. Patient Experience     Thank you for trusting us with your care. You may receive a survey from a company called [employer] asking for your feedback. We would appreciate it if you took a few minutes to share your experience.   Your input is very valuable to us

## 2022-12-28 NOTE — PLAN OF CARE
Discharge instructions given. Patient verbalized understanding. Return to [facility] in 1 week(s).

## 2022-12-28 NOTE — PROGRESS NOTES
Multilayer Compression Wrap   Below the Knee    NAME:  Ang Kearns  YOB: 1950  MEDICAL RECORD NUMBER:  [id number]  DATE:  12/28/2022    Multilayer compression wrap: Removed old Multilayer wrap if indicated and wash leg with mild soap/water. Applied moisturizing agent to dry skin as needed. Applied primary and secondary dressing as ordered. Applied multilayered dressing below the knee to left lower leg. Instructed patient/caregiver not to remove dressing and to keep it clean and dry. Instructed patient/caregiver on complications to report to provider, such as pain, numbness in toes, heavy drainage, and slippage of dressing. Instructed patient on purpose of compression dressing and on activity and exercise recommendations.      Applied  2 layer wrap from toes to knee overlapping each time    Electronically signed by Luisa Valero RN on 12/28/2022 at 3:59 PM

## 2022-12-28 NOTE — PROGRESS NOTES
Jourdan Medrano  Progress Note and Procedure Note      Karley Beaver  AGE: 67 y.o. GENDER: female  : 1950  TODAY'S DATE:  2022    Subjective:     Chief Complaint   Patient presents with    Wound Check     legs         HISTORY of PRESENT ILLNESS HPI     Karley Beaver is a 67 y.o. female who presents today for wound evaluation. History of Wound: Patient admits to having a surgical procedure to remove skin cancer at the end of 2022. She has not been able to heal the wound since the surgery. She admits to having a previous injury to the leg and having discoloration in the area where the skin cancer was for many years. She admits to being diabetic and smoking 1-1/2 packs of cigarettes daily. She has left the dressing intact without incident. She admits to itching. She did take off the dressing as directed this morning. She admits she has been itching her leg all day. She denies current nausea, vomiting, fever, chills, shortness of breath or chest pain. Wound Pain:  intermittent  Severity:  3 / 10   Wound Type:  venous, arterial, diabetic, and non-healing surgical  Modifying Factors:  edema, venous stasis, diabetes, smoking, arterial insufficiency, and decreased tissue oxygenation  Associated Signs/Symptoms:  edema, drainage, and pain        PAST MEDICAL HISTORY        Diagnosis Date    Cancer [facility])     Skin cancer Left lower leg    COPD (chronic obstructive pulmonary disease) ([facility] [address]     Diabetes mellitus ([facility] [address]     Hyperlipidemia        PAST SURGICAL HISTORY    Past Surgical History:   Procedure Laterality Date    ORTHOPEDIC SURGERY Left        FAMILY HISTORY    History reviewed. No pertinent family history.     SOCIAL HISTORY    Social History     Tobacco Use    Smoking status: Every Day     Packs/day: 1.50     Years: 40.00     Pack years: 60.00     Types: Cigarettes    Smokeless tobacco: Never   Vaping Use    Vaping Use: Never used   Substance Use Topics    Alcohol use: Not Currently    Drug use: Never       ALLERGIES    No Known Allergies    MEDICATIONS    Current Outpatient Medications on File Prior to Encounter   Medication Sig Dispense Refill    collagenase (SANTYL) 250 UNIT/GM ointment Quantity sufficient for 30 days. Apply topically daily. Wounds (cm): Left lower leg- 3.7 x 2 60 g 1    Cyanocobalamin (VITAMIN B 12) 500 MCG TABS Take 5,000 mcg by mouth daily      vitamin C (ASCORBIC ACID) 500 MG tablet Take 500 mg by mouth daily      Calcium Carb-Cholecalciferol (CALCIUM 1000 + D) 1000-800 MG-UNIT TABS Take 2 tablets by mouth daily      Multiple Vitamin (MULTIVITAMIN PO) Take 1 tablet by mouth daily      albuterol sulfate HFA (PROVENTIL;VENTOLIN;PROAIR) 108 (90 Base) MCG/ACT inhaler Inhale 1 puff into the lungs every 6 hours as needed (Patient not taking: Reported on 11/4/2022)      acetaminophen-codeine (TYLENOL #3) 300-30 MG per tablet Take 1 tablet by mouth as needed. aspirin 81 MG EC tablet Take 81 mg by mouth daily      dulaglutide (TRULICITY) 1.5 CY/8.8XT SC injection Inject 0.5 mLs into the skin once a week      lisinopril (PRINIVIL;ZESTRIL) 2.5 MG tablet Take 2.5 mg by mouth daily      pravastatin (PRAVACHOL) 20 MG tablet Take 20 mg by mouth daily      sitaGLIPtan-metFORMIN (JANUMET)  MG per tablet Take 1 tablet by mouth daily      gentamicin (GARAMYCIN) 0.1 % cream Apply topically daily. 30 g 1     No current facility-administered medications on file prior to encounter. REVIEW OF SYSTEMS    Pertinent items are noted in HPI. Objective:      /82   Pulse 99   Temp 97.2 °F (36.2 °C) (Infrared)   Resp 16     PHYSICAL EXAM    Vascular: Vascular status Impaired  palpable pedal pulses, right DP1/4 and PT1/4, left DP1/4 and PT1/4. CFT 3 seconds digits 1 to 5 bilateral.  Hair growthAbsent  both lower extremities and feet.   Skin temperature is warm to cool from pretibial area to distal digits bilateral.  Exam is negative for rubor, pallor, cyanosis or signs of acute vascular compromise bilaterally. Exam is negative for edema bilateral lower extremity. Varicosities Absent  bilateral lower extremity. Neuro: Neurologic status diminished bilateral with epicritic Present , proprioceptive Present, vibratory sensationPresent and protopathicPresent. DTRs Present bilateral Achilles. There were no reproducible neuritic symptoms on exam bilateral feet/ankles. Derm: Ulceration to left leg. Ecchymosis Absent  bilateral feet/foot. Musculoskeletal: No pain with debridement of wound 5/5 muscle strength in/eversion and dorsi/plantarflexion bilateral feet. No gross instability noted. 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 Devitalized Tissue Debrided:  fibrin, biofilm, slough, necrotic/eschar, and exudate    Pre Debridement Measurements:  Are located in the Wound Documentation Flow Sheet    Wound #: 1   Wound Care Documentation:  Wound 10/28/22 Leg Left; Lower;Medial #1 (Active)   Wound Image   10/28/22 0948   Wound Etiology Non-Healing Surgical 12/28/22 1509   Wound Cleansed Wound cleanser 12/28/22 1509   Dressing/Treatment Antibacterial ointment;Dry dressing;Triad hydro/zinc oxide-based hydrophilic paste 79/90/43 2998   Offloading for Diabetic Foot Ulcers Offloading not ordered 12/14/22 1416   Wound Length (cm) 0.6 cm 12/28/22 1509   Wound Width (cm) 0.3 cm 12/28/22 1509   Wound Depth (cm) 0.2 cm 12/28/22 1509   Wound Surface Area (cm^2) 0.18 cm^2 12/28/22 1509   Change in Wound Size % (l*w) 96.92 12/28/22 1509   Wound Volume (cm^3) 0.036 cm^3 12/28/22 1509   Wound Healing % 98 12/28/22 1509   Post-Procedure Length (cm) 0.6 cm 12/28/22 1531   Post-Procedure Width (cm) 0.3 cm 12/28/22 1531   Post-Procedure Depth (cm) 0.2 cm 12/28/22 1531   Post-Procedure Surface Area (cm^2) 0.18 cm^2 12/28/22 1531   Post-Procedure Volume (cm^3) 0.036 cm^3 12/28/22 1531   Wound Assessment Bleeding 12/28/22 1531   Drainage Amount Small 12/28/22 1509   Drainage Description Serous 12/28/22 1509   Odor None 12/28/22 1509   Aga-wound Assessment Intact 12/28/22 1509   Margins Defined edges 12/28/22 1509   Wound Thickness Description not for Pressure Injury Full thickness 10/28/22 0948   Number of days: 61          Total Surface Area Debrided:  <1 sq cm     Percentage of wound debrided 100%    Bleeding:  Minimal    Hemostasis Achieved:  by pressure    Procedural Pain:  0  / 10     Post Procedural Pain:  0 / 10     Response to treatment:  Well tolerated by patient. Plan:   Patient examined and evaluated  Wound sharply debrided without incident  Discussed appropriate diabetic diet  Encouraged smoking decrease or cessation  Multi layer compression wrap with triad and gentamicin cream.  The nature of the patient's condition was explained in depth.  The patient was informed that their compliance to the treatment plan is paramount to successful healing and prevention of further ulceration and/or infection     Discharge Treatment Daily dressing changes    Written Patient Discharge Instructions Given            Electronically signed by Sheri Bender DPM on 12/28/2022 at 3:44 PM

## 2022-12-29 NOTE — DISCHARGE INSTRUCTIONS
[facility], [address]  Telephone: [phone]     Discharge Instructions     Important reminders:     **If you have any signs and symptoms of illness (Cough, fever, congestion, nausea, vomiting, diarrhea, etc.) please call the wound care center prior to your appointment. 1. Increase Protein intake for optimal wound healing  2. No added salt to reduce any swelling  3. If diabetic, maintain good glucose control  4. If you smoke, smoking prohibits wound healing, we ask that you refrain from smoking. 5. When taking antibiotics take the entire prescription as ordered. Do not stop taking until medication is all gone unless otherwise instructed. 6. Exercise as tolerated. 7. Keep weight off wounds and reposition every 2 hours if applicable. 8. If wound(s) is on your lower extremity, elevate legs to the level of the heart or above for 30 minutes 4-5 times a day and/or when sitting. Avoid standing for long periods of time. 9. Do not get wounds wet in bath or shower unless otherwise instructed by your physician. If your wound is on your foot or leg, you may purchase a cast bag. Please ask at the pharmacy. If Vascular testing is ordered, please call [address] [place] [address] ([phone]) to schedule. Vascular tests ordered by Wound Care Physicians may take up to 2 hours to complete. Please keep that in mind when scheduling. If Vascular testing is scheduled, please bring supplies to replace your dressing after testing is done. The vascular department does not stock supplies. Wound:  Left leg     With each dressing change, rinse wounds with 0.9% Saline. (May use wound wash or soft contact solution. Both can be purchased at a local drug store). If unable to obtain saline, may use a gentle soap and water. Dressing care: NO Smoking. Do not get wet in the shower. Lotrisone to leg under wrap.  Mix Gentamicin cream & Triad, Collagen powder, 4 x 4's, Coflex Calamine- these will be changed at your next visit unless they slide down or cause pain. If they slide, gets wet, or cause pain please call the wound care center, you may need to come in to be re-wrapped. If you are unable to get to your follow up appointment you will need to remove your wraps at home & place some kind of compression. Keep wound covered at all times. May remove wrap the day you come for appt to wash leg, the cover wound     Compression Wraps  Location: Left lower leg below knee  Type: Coflex Calamine     Your doctor has ordered compression therapy for your wound. Compression bandages reduce the swelling, or edema, in your legs and prevent it from returning. The wound care staff will apply your compression wrap. It must be removed and re-applied at least weekly. As the swelling decreases, the boot no longer provides adequate compression and you need a new one. Once applied, you need to know how to take care of your compression wrap. The boot must stay dry. Do not get it wet in the shower or tub. You may do a partial bath, or you can cover the boot with a large plastic bag, secured at the top, so that no water can get in. Avoid standing in one place for long periods of time. If you must  one place, shift your weight and change positions often. If you have CHF, consult your doctor before following the next two recommendations for leg elevation. When sitting, elevate your legs on pillows, or put blocks under the foot of your bed. Your legs should be higher than your heart. If your boot becomes painful, or you notice an increase in swelling in your toes, numbness or tingling, or purple color to your toes, remove the wrap and call the [address] [place] [address] Road. If it is after hours, call your doctor for instructions or go to the nearest emergency room.       Dr Vasu Quezada, Vascular Surgeon  - Cecy Ocampo for studies  [address] [place] [address], [address]  Phone# [phone]  Fax# [phone] Home Care Agency/Facility:      Your wound-care supplies will be provided by: [facility] -   phone #: [phone]     Please note, depending on your insurance coverage, you may have out-of-pocket expenses for these supplies. Someone from the company should call you to confirm your order and discuss those potential costs before they ship your products -- please anticipate that call. If your out-of-pocket cost could be substantial, Many companies have financial hardship programs for patients who qualify, so please ask about that if you might need a hand. If you have any questions about your supplies or your potential out-of-pocket costs, or if you need to place an order for a refill of supplies (typically monthly), please call the company directly. Your  is Smitha Jarrett     Follow up with Dr Damon Baldwin In 1 week(s) in the wound care center. Wound Care Center Information: Should you experience any significant changes in your wound(s) or have questions about your wound care, please contact the [employer] at [phone] Monday  - Thursday 8:00 am - 4:00 pm and Friday 8:00 am - 1:00pm. If you need help with your wound outside these hours and cannot wait until we are again available, contact your PCP or go to the hospital emergency room. PLEASE NOTE: IF YOU ARE UNABLE TO OBTAIN WOUND SUPPLIES, CONTINUE TO USE THE SUPPLIES YOU HAVE AVAILABLE UNTIL YOU ARE ABLE TO REACH US. IT IS MOST IMPORTANT TO KEEP THE WOUND COVERED AT ALL TIMES. Patient Experience     Thank you for trusting us with your care. You may receive a survey from a company called [employer] asking for your feedback. We would appreciate it if you took a few minutes to share your experience.   Your input is very valuable to us

## 2023-01-04 ENCOUNTER — HOSPITAL ENCOUNTER (OUTPATIENT)
Dept: WOUND CARE | Age: 73
Discharge: HOME | End: 2023-01-04
Payer: COMMERCIAL

## 2023-01-04 VITALS
RESPIRATION RATE: 16 BRPM | SYSTOLIC BLOOD PRESSURE: 131 MMHG | HEART RATE: 103 BPM | TEMPERATURE: 96.9 F | DIASTOLIC BLOOD PRESSURE: 76 MMHG

## 2023-01-04 DIAGNOSIS — L97.223 NON-PRESSURE CHRONIC ULCER OF LEFT CALF WITH NECROSIS OF MUSCLE (HCC): Primary | ICD-10-CM

## 2023-01-04 PROCEDURE — 29581 APPL MULTLAYER CMPRN SYS LEG: CPT

## 2023-01-04 PROCEDURE — 11042 DBRDMT SUBQ TIS 1ST 20SQCM/<: CPT

## 2023-01-04 RX ORDER — LIDOCAINE 50 MG/G
OINTMENT TOPICAL ONCE
OUTPATIENT
Start: 2023-01-04 | End: 2023-01-04

## 2023-01-04 RX ORDER — BACITRACIN ZINC AND POLYMYXIN B SULFATE 500; 1000 [USP'U]/G; [USP'U]/G
OINTMENT TOPICAL ONCE
OUTPATIENT
Start: 2023-01-04 | End: 2023-01-04

## 2023-01-04 RX ORDER — GINSENG 100 MG
CAPSULE ORAL ONCE
OUTPATIENT
Start: 2023-01-04 | End: 2023-01-04

## 2023-01-04 RX ORDER — LIDOCAINE 40 MG/G
CREAM TOPICAL ONCE
OUTPATIENT
Start: 2023-01-04 | End: 2023-01-04

## 2023-01-04 RX ORDER — LIDOCAINE 40 MG/G
CREAM TOPICAL ONCE
Status: DISCONTINUED | OUTPATIENT
Start: 2023-01-04 | End: 2023-01-05 | Stop reason: HOSPADM

## 2023-01-04 RX ORDER — CLOBETASOL PROPIONATE 0.5 MG/G
OINTMENT TOPICAL ONCE
OUTPATIENT
Start: 2023-01-04 | End: 2023-01-04

## 2023-01-04 RX ORDER — LIDOCAINE HYDROCHLORIDE 20 MG/ML
JELLY TOPICAL ONCE
OUTPATIENT
Start: 2023-01-04 | End: 2023-01-04

## 2023-01-04 RX ORDER — LIDOCAINE HYDROCHLORIDE 40 MG/ML
SOLUTION TOPICAL ONCE
OUTPATIENT
Start: 2023-01-04 | End: 2023-01-04

## 2023-01-04 RX ORDER — GENTAMICIN SULFATE 1 MG/G
OINTMENT TOPICAL ONCE
OUTPATIENT
Start: 2023-01-04 | End: 2023-01-04

## 2023-01-04 RX ORDER — BACITRACIN, NEOMYCIN, POLYMYXIN B 400; 3.5; 5 [USP'U]/G; MG/G; [USP'U]/G
OINTMENT TOPICAL ONCE
OUTPATIENT
Start: 2023-01-04 | End: 2023-01-04

## 2023-01-04 RX ORDER — BETAMETHASONE DIPROPIONATE 0.05 %
OINTMENT (GRAM) TOPICAL ONCE
OUTPATIENT
Start: 2023-01-04 | End: 2023-01-04

## 2023-01-04 NOTE — PLAN OF CARE
Multilayer Compression Wrap   Below the Knee    NAME:  Heladio Peraza  YOB: 1950  MEDICAL RECORD NUMBER:  [id number]  DATE:  1/4/2023    Multilayer compression wrap: Removed old Multilayer wrap if indicated and wash leg with mild soap/water. Applied moisturizing agent to dry skin as needed. Applied primary and secondary dressing as ordered. Applied multilayered dressing below the knee to left lower leg. Instructed patient/caregiver not to remove dressing and to keep it clean and dry. Instructed patient/caregiver on complications to report to provider, such as pain, numbness in toes, heavy drainage, and slippage of dressing. Instructed patient on purpose of compression dressing and on activity and exercise recommendations.      Applied  2 layer wrap from toes to knee overlapping each time    Electronically signed by Edison Calles RN on 1/4/2023 at 3:28 PM

## 2023-01-04 NOTE — PROGRESS NOTES
Jourdan Merdano  Progress Note and Procedure Note      Abdon Cruz  AGE: 67 y.o. GENDER: female  : 1950  TODAY'S DATE:  2023    Subjective:     Chief Complaint   Patient presents with    Wound Check     Left leg         HISTORY of PRESENT ILLNESS HPI     Abdon Cruz is a 67 y.o. female who presents today for wound evaluation. History of Wound: Patient admits to having a surgical procedure to remove skin cancer at the end of 2022. She has not been able to heal the wound since the surgery. She admits to having a previous injury to the leg and having discoloration in the area where the skin cancer was for many years. She admits to being diabetic and smoking 1-1/2 packs of cigarettes daily. She has left the dressing intact without incident. She admits to itching. She did take off the dressing as directed this morning. She admits she has been itching her leg all day. She denies current nausea, vomiting, fever, chills, shortness of breath or chest pain. Wound Pain:  intermittent  Severity:  3 / 10   Wound Type:  venous, arterial, diabetic, and non-healing surgical  Modifying Factors:  edema, venous stasis, diabetes, smoking, arterial insufficiency, and decreased tissue oxygenation  Associated Signs/Symptoms:  edema, drainage, and pain        PAST MEDICAL HISTORY        Diagnosis Date    Cancer [facility])     Skin cancer Left lower leg    COPD (chronic obstructive pulmonary disease) ([facility] [address]     Diabetes mellitus ([facility] [address]     Hyperlipidemia        PAST SURGICAL HISTORY    Past Surgical History:   Procedure Laterality Date    ORTHOPEDIC SURGERY Left        FAMILY HISTORY    History reviewed. No pertinent family history.     SOCIAL HISTORY    Social History     Tobacco Use    Smoking status: Every Day     Packs/day: 1.50     Years: 40.00     Pack years: 60.00     Types: Cigarettes    Smokeless tobacco: Never   Vaping Use    Vaping Use: Never used   Substance Use Topics    Alcohol use: Not Currently    Drug use: Never       ALLERGIES    No Known Allergies    MEDICATIONS    Current Outpatient Medications on File Prior to Encounter   Medication Sig Dispense Refill    collagenase (SANTYL) 250 UNIT/GM ointment Quantity sufficient for 30 days. Apply topically daily. Wounds (cm): Left lower leg- 3.7 x 2 60 g 1    Cyanocobalamin (VITAMIN B 12) 500 MCG TABS Take 5,000 mcg by mouth daily      vitamin C (ASCORBIC ACID) 500 MG tablet Take 500 mg by mouth daily      Calcium Carb-Cholecalciferol (CALCIUM 1000 + D) 1000-800 MG-UNIT TABS Take 2 tablets by mouth daily      Multiple Vitamin (MULTIVITAMIN PO) Take 1 tablet by mouth daily      albuterol sulfate HFA (PROVENTIL;VENTOLIN;PROAIR) 108 (90 Base) MCG/ACT inhaler Inhale 1 puff into the lungs every 6 hours as needed (Patient not taking: Reported on 11/4/2022)      acetaminophen-codeine (TYLENOL #3) 300-30 MG per tablet Take 1 tablet by mouth as needed. aspirin 81 MG EC tablet Take 81 mg by mouth daily      dulaglutide (TRULICITY) 1.5 OD/7.7DH SC injection Inject 0.5 mLs into the skin once a week      lisinopril (PRINIVIL;ZESTRIL) 2.5 MG tablet Take 2.5 mg by mouth daily      pravastatin (PRAVACHOL) 20 MG tablet Take 20 mg by mouth daily      sitaGLIPtan-metFORMIN (JANUMET)  MG per tablet Take 1 tablet by mouth daily      gentamicin (GARAMYCIN) 0.1 % cream Apply topically daily. 30 g 1     No current facility-administered medications on file prior to encounter. REVIEW OF SYSTEMS    Pertinent items are noted in HPI. Objective:      /76   Pulse (!) 103   Temp 96.9 °F (36.1 °C) (Infrared)   Resp 16     PHYSICAL EXAM    Vascular: Vascular status Impaired  palpable pedal pulses, right DP1/4 and PT1/4, left DP1/4 and PT1/4. CFT 3 seconds digits 1 to 5 bilateral.  Hair growthAbsent  both lower extremities and feet.   Skin temperature is warm to cool from pretibial area to distal digits bilateral.  Exam is negative for rubor, pallor, cyanosis or signs of acute vascular compromise bilaterally. Exam is negative for edema bilateral lower extremity. Varicosities Absent  bilateral lower extremity. Neuro: Neurologic status diminished bilateral with epicritic Present , proprioceptive Present, vibratory sensationPresent and protopathicPresent. DTRs Present bilateral Achilles. There were no reproducible neuritic symptoms on exam bilateral feet/ankles. Derm: Ulceration to left leg. Ecchymosis Absent  bilateral feet/foot. Musculoskeletal: No pain with debridement of wound 5/5 muscle strength in/eversion and dorsi/plantarflexion bilateral feet. No gross instability noted. 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 Devitalized Tissue Debrided:  fibrin, biofilm, slough, necrotic/eschar, and exudate    Pre Debridement Measurements:  Are located in the Wound Documentation Flow Sheet    Wound #: 1   Wound Care Documentation:  Wound 10/28/22 Leg Left; Lower;Medial #1 (Active)   Wound Image   10/28/22 0948   Wound Etiology Non-Healing Surgical 01/04/23 1359   Wound Cleansed Cleansed with saline 01/04/23 1359   Dressing/Treatment Antibacterial ointment;Dry dressing;Triad hydro/zinc oxide-based hydrophilic paste 00/48/62 1209   Offloading for Diabetic Foot Ulcers Offloading not ordered 01/04/23 1359   Wound Length (cm) 1 cm 01/04/23 1359   Wound Width (cm) 0.4 cm 01/04/23 1359 Wound Depth (cm) 0.1 cm 01/04/23 1359   Wound Surface Area (cm^2) 0.4 cm^2 01/04/23 1359   Change in Wound Size % (l*w) 93.16 01/04/23 1359   Wound Volume (cm^3) 0.04 cm^3 01/04/23 1359   Wound Healing % 98 01/04/23 1359   Post-Procedure Length (cm) 1 cm 01/04/23 1416   Post-Procedure Width (cm) 0.4 cm 01/04/23 1416   Post-Procedure Depth (cm) 0.1 cm 01/04/23 1416   Post-Procedure Surface Area (cm^2) 0.4 cm^2 01/04/23 1416   Post-Procedure Volume (cm^3) 0.04 cm^3 01/04/23 1416   Wound Assessment Bleeding 01/04/23 1416   Drainage Amount Scant 01/04/23 1359   Drainage Description Serous 01/04/23 1359   Odor None 01/04/23 1359   Aga-wound Assessment Intact 01/04/23 1359   Margins Attached edges 01/04/23 1359   Wound Thickness Description not for Pressure Injury Full thickness 10/28/22 0948   Number of days: 68       Total Surface Area Debrided:  <1 sq cm     Percentage of wound debrided 100%    Bleeding:  Minimal    Hemostasis Achieved:  by pressure    Procedural Pain:  0  / 10     Post Procedural Pain:  0 / 10     Response to treatment:  Well tolerated by patient. Plan:   Patient examined and evaluated  Wound sharply debrided without incident  Discussed appropriate diabetic diet  Encouraged smoking decrease or cessation  Multi layer compression wrap with triad and gentamicin cream.  The nature of the patient's condition was explained in depth.  The patient was informed that their compliance to the treatment plan is paramount to successful healing and prevention of further ulceration and/or infection     Discharge Treatment Daily dressing changes    Written Patient Discharge Instructions Given            Electronically signed by Nelson Graves DPM on 1/4/2023 at 2:52 PM

## 2023-01-09 NOTE — DISCHARGE INSTRUCTIONS
[facility]  [address] [place] [address], [address] [place] [address] Road  Telephone: [phone]        Dressing care: NO Smoking. Dry dressing Lotrisone to leg  Change every day, 1 tubi      Congratulations! You have completed your treatment program!    To prevent Venous Wounds from recurring again:  Elevate your legs at least parallel to the ground while sitting. Wear your prescription support stockings everyday and remove at night while you sleep. Replace your stockings every 4-6 months so that your legs continue to get the support they need. Inspect your legs and feet daily and report any increased swelling, unusual redness or new wounds to your physician. Wash your legs and feet regularly with mild soap and water and moisturize daily. Continue to use compression pumps if prescribed by your physician. Avoid overeating. Extra weight adds pressure on the veins in your legs. Limit salty foods as they promote swelling or fluid retention in your legs. Call the [address] [place] [address] Road if your wound reopens, if new wounds occur, or if you have any other questions about your follow up care [phone].

## 2023-01-11 ENCOUNTER — HOSPITAL ENCOUNTER (OUTPATIENT)
Dept: WOUND CARE | Age: 73
Discharge: HOME | End: 2023-01-11
Payer: COMMERCIAL

## 2023-01-11 VITALS
RESPIRATION RATE: 16 BRPM | HEART RATE: 93 BPM | SYSTOLIC BLOOD PRESSURE: 130 MMHG | DIASTOLIC BLOOD PRESSURE: 67 MMHG | TEMPERATURE: 96.3 F

## 2023-01-11 DIAGNOSIS — L97.223 NON-PRESSURE CHRONIC ULCER OF LEFT CALF WITH NECROSIS OF MUSCLE (HCC): Primary | ICD-10-CM

## 2023-01-11 PROCEDURE — 99213 OFFICE O/P EST LOW 20 MIN: CPT

## 2023-01-11 RX ORDER — LIDOCAINE 40 MG/G
CREAM TOPICAL ONCE
OUTPATIENT
Start: 2023-01-11 | End: 2023-01-11

## 2023-01-11 RX ORDER — LIDOCAINE HYDROCHLORIDE 40 MG/ML
SOLUTION TOPICAL ONCE
OUTPATIENT
Start: 2023-01-11 | End: 2023-01-11

## 2023-01-11 RX ORDER — GINSENG 100 MG
CAPSULE ORAL ONCE
OUTPATIENT
Start: 2023-01-11 | End: 2023-01-11

## 2023-01-11 RX ORDER — BACITRACIN ZINC AND POLYMYXIN B SULFATE 500; 1000 [USP'U]/G; [USP'U]/G
OINTMENT TOPICAL ONCE
OUTPATIENT
Start: 2023-01-11 | End: 2023-01-11

## 2023-01-11 RX ORDER — CLOBETASOL PROPIONATE 0.5 MG/G
OINTMENT TOPICAL ONCE
OUTPATIENT
Start: 2023-01-11 | End: 2023-01-11

## 2023-01-11 RX ORDER — LIDOCAINE 50 MG/G
OINTMENT TOPICAL ONCE
OUTPATIENT
Start: 2023-01-11 | End: 2023-01-11

## 2023-01-11 RX ORDER — BACITRACIN, NEOMYCIN, POLYMYXIN B 400; 3.5; 5 [USP'U]/G; MG/G; [USP'U]/G
OINTMENT TOPICAL ONCE
OUTPATIENT
Start: 2023-01-11 | End: 2023-01-11

## 2023-01-11 RX ORDER — BETAMETHASONE DIPROPIONATE 0.05 %
OINTMENT (GRAM) TOPICAL ONCE
OUTPATIENT
Start: 2023-01-11 | End: 2023-01-11

## 2023-01-11 RX ORDER — LIDOCAINE HYDROCHLORIDE 20 MG/ML
JELLY TOPICAL ONCE
OUTPATIENT
Start: 2023-01-11 | End: 2023-01-11

## 2023-01-11 RX ORDER — GENTAMICIN SULFATE 1 MG/G
OINTMENT TOPICAL ONCE
OUTPATIENT
Start: 2023-01-11 | End: 2023-01-11

## 2023-01-11 RX ORDER — LIDOCAINE 40 MG/G
CREAM TOPICAL ONCE
Status: DISCONTINUED | OUTPATIENT
Start: 2023-01-11 | End: 2023-01-12 | Stop reason: HOSPADM

## 2023-01-11 NOTE — PROGRESS NOTES
Jourdan Medrano  Progress Note and Procedure Note      Jose Rios  AGE: 67 y.o. GENDER: female  : 1950  TODAY'S DATE:  2023    Subjective:     Chief Complaint   Patient presents with    Wound Check     Left lower leg         HISTORY of PRESENT ILLNESS HPI     Jose Rios is a 67 y.o. female who presents today for wound evaluation. History of Wound: Patient admits to having a surgical procedure to remove skin cancer at the end of 2022. She has not been able to heal the wound since the surgery. She admits to having a previous injury to the leg and having discoloration in the area where the skin cancer was for many years. She admits to being diabetic and smoking 1-1/2 packs of cigarettes daily. She has left the dressing intact without incident. She admits to itching. He has no other complaints today. She denies current nausea, vomiting, fever, chills, shortness of breath or chest pain. Wound Pain:  none  Severity:  0/ 10   Wound Type:  venous, arterial, diabetic, and non-healing surgical  Modifying Factors:  edema, venous stasis, diabetes, smoking, arterial insufficiency, and decreased tissue oxygenation  Associated Signs/Symptoms:  edema, drainage, and pain        PAST MEDICAL HISTORY        Diagnosis Date    Cancer [facility])     Skin cancer Left lower leg    COPD (chronic obstructive pulmonary disease) ([facility] [address]     [facility]     Hyperlipidemia        PAST SURGICAL HISTORY    Past Surgical History:   Procedure Laterality Date    ORTHOPEDIC SURGERY Left        FAMILY HISTORY    History reviewed. No pertinent family history.     SOCIAL HISTORY    Social History     Tobacco Use    Smoking status: Every Day     Packs/day: 1.50     Years: 40.00     Pack years: 60.00     Types: Cigarettes    Smokeless tobacco: Never   Vaping Use    Vaping Use: Never used   Substance Use Topics    Alcohol use: Not Currently    Drug use: Never       ALLERGIES    No Known Allergies    MEDICATIONS    Current Outpatient Medications on File Prior to Encounter   Medication Sig Dispense Refill    collagenase (SANTYL) 250 UNIT/GM ointment Quantity sufficient for 30 days. Apply topically daily. Wounds (cm): Left lower leg- 3.7 x 2 60 g 1    Cyanocobalamin (VITAMIN B 12) 500 MCG TABS Take 5,000 mcg by mouth daily      vitamin C (ASCORBIC ACID) 500 MG tablet Take 500 mg by mouth daily      Calcium Carb-Cholecalciferol (CALCIUM 1000 + D) 1000-800 MG-UNIT TABS Take 2 tablets by mouth daily      Multiple Vitamin (MULTIVITAMIN PO) Take 1 tablet by mouth daily      albuterol sulfate HFA (PROVENTIL;VENTOLIN;PROAIR) 108 (90 Base) MCG/ACT inhaler Inhale 1 puff into the lungs every 6 hours as needed (Patient not taking: Reported on 11/4/2022)      acetaminophen-codeine (TYLENOL #3) 300-30 MG per tablet Take 1 tablet by mouth as needed. aspirin 81 MG EC tablet Take 81 mg by mouth daily      dulaglutide (TRULICITY) 1.5 RZ/6.9ER SC injection Inject 0.5 mLs into the skin once a week      lisinopril (PRINIVIL;ZESTRIL) 2.5 MG tablet Take 2.5 mg by mouth daily      pravastatin (PRAVACHOL) 20 MG tablet Take 20 mg by mouth daily      sitaGLIPtan-metFORMIN (JANUMET)  MG per tablet Take 1 tablet by mouth daily      gentamicin (GARAMYCIN) 0.1 % cream Apply topically daily. 30 g 1     No current facility-administered medications on file prior to encounter. REVIEW OF SYSTEMS    Pertinent items are noted in HPI. Objective:      /67   Pulse 93   Temp (!) 96.3 °F (35.7 °C) (Infrared)   Resp 16     PHYSICAL EXAM    Vascular: Vascular status Impaired  palpable pedal pulses, right DP1/4 and PT1/4, left DP1/4 and PT1/4. CFT 3 seconds digits 1 to 5 bilateral.  Hair growthAbsent  both lower extremities and feet. Skin temperature is warm to cool from pretibial area to distal digits bilateral.  Exam is negative for rubor, pallor, cyanosis or signs of acute vascular compromise bilaterally. Exam is negative for edema bilateral lower extremity. Varicosities Absent  bilateral lower extremity. Neuro: Neurologic status diminished bilateral with epicritic Present , proprioceptive Present, vibratory sensationPresent and protopathicPresent. DTRs Present bilateral Achilles. There were no reproducible neuritic symptoms on exam bilateral feet/ankles. Derm: Ulceration to left leg healed. Ecchymosis Absent  bilateral feet/foot. Musculoskeletal: No pain with debridement of wound 5/5 muscle strength in/eversion and dorsi/plantarflexion bilateral feet. No gross instability noted. 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  The patient was informed that their compliance to the treatment plan is paramount to successful healing and prevention of further ulceration and/or infection     Discharge Treatment Daily dressing changes    Written Patient Discharge Instructions Given            Electronically signed by Sridevi Hampton DPM on 1/11/2023 at 2:46 PM

## 2025-05-05 NOTE — DISCHARGE INSTRUCTIONS
[facility], [address] [place] [address] Road  Telephone: [phone]     Discharge Instructions     Important reminders:     **If you have any signs and symptoms of illness (Cough, fever, congestion, nausea, vomiting, diarrhea, etc.) please call the wound care center prior to your appointment. 1. Increase Protein intake for optimal wound healing  2. No added salt to reduce any swelling  3. If diabetic, maintain good glucose control  4. If you smoke, smoking prohibits wound healing, we ask that you refrain from smoking. 5. When taking antibiotics take the entire prescription as ordered. Do not stop taking until medication is all gone unless otherwise instructed. 6. Exercise as tolerated. 7. Keep weight off wounds and reposition every 2 hours if applicable. 8. If wound(s) is on your lower extremity, elevate legs to the level of the heart or above for 30 minutes 4-5 times a day and/or when sitting. Avoid standing for long periods of time. 9. Do not get wounds wet in bath or shower unless otherwise instructed by your physician. If your wound is on your foot or leg, you may purchase a cast bag. Please ask at the pharmacy. If Vascular testing is ordered, please call [address] [place] [address] [phone]) to schedule. Vascular tests ordered by Wound Care Physicians may take up to 2 hours to complete. Please keep that in mind when scheduling. If Vascular testing is scheduled, please bring supplies to replace your dressing after testing is done. The vascular department does not stock supplies. Wound:  Left leg     With each dressing change, rinse wounds with 0.9% Saline. (May use wound wash or soft contact solution. Both can be purchased at a local drug store). If unable to obtain saline, may use a gentle soap and water. Dressing care: NO Smoking. Do not get wet in the shower. Clean wound with Vash (give pt a bottle).  Santyl, dry dressing, 1 layer tubi Writer left message on mother's phone to call back and reschedule. If patient's mother calls back please reschedule 5/19/25 appt to later on same day or different date.   - change daily. Until the Santyl arrives continue using Gentamicin cream and Triad mixed. Place in wound daily. Cover with dry dressing (kerramax border or 4x4 and roll gauze). 1 tubigrip. Keep wound covered at all times     Dr Mac Daniels, Vascular Surgeon  - [facility] Motta for studies  [facility] , [address]  Phone# [phone]  Fax# [phone]        Home Care Agency/Facility:      Your wound-care supplies will be provided by: [facility] -   phone #: [phone]     Please note, depending on your insurance coverage, you may have out-of-pocket expenses for these supplies. Someone from the company should call you to confirm your order and discuss those potential costs before they ship your products -- please anticipate that call. If your out-of-pocket cost could be substantial, Many companies have financial hardship programs for patients who qualify, so please ask about that if you might need a hand. If you have any questions about your supplies or your potential out-of-pocket costs, or if you need to place an order for a refill of supplies (typically monthly), please call the company directly. Your  is Renata López     Follow up with Dr Marylou Carrasquillo In 1 week(s) in the wound care center. Wound Care Center Information: Should you experience any significant changes in your wound(s) or have questions about your wound care, please [facility] at [phone] Monday  - Thursday 8:00 am - 4:00 pm and Friday 8:00 am - 1:00pm. If you need help with your wound outside these hours and cannot wait until we are again available, contact your PCP or go to the hospital emergency room. PLEASE NOTE: IF YOU ARE UNABLE TO OBTAIN WOUND SUPPLIES, CONTINUE TO USE THE SUPPLIES YOU HAVE AVAILABLE UNTIL YOU ARE ABLE TO REACH US. IT IS MOST IMPORTANT TO KEEP THE WOUND COVERED AT ALL TIMES. Patient Experience     Thank you for trusting us with your care.   You may receive a survey from a company called [employer] asking for [employer]. We would appreciate it if you took a few minutes to share your experience.   Your input is very valuable to us